# Patient Record
Sex: MALE | Race: WHITE | NOT HISPANIC OR LATINO
[De-identification: names, ages, dates, MRNs, and addresses within clinical notes are randomized per-mention and may not be internally consistent; named-entity substitution may affect disease eponyms.]

---

## 2020-06-08 ENCOUNTER — APPOINTMENT (OUTPATIENT)
Dept: HEART AND VASCULAR | Facility: CLINIC | Age: 75
End: 2020-06-08
Payer: MEDICARE

## 2020-06-08 DIAGNOSIS — I10 ESSENTIAL (PRIMARY) HYPERTENSION: ICD-10-CM

## 2020-06-08 DIAGNOSIS — E78.5 HYPERLIPIDEMIA, UNSPECIFIED: ICD-10-CM

## 2020-06-08 PROBLEM — Z00.00 ENCOUNTER FOR PREVENTIVE HEALTH EXAMINATION: Status: ACTIVE | Noted: 2020-06-08

## 2020-06-08 PROCEDURE — 99204 OFFICE O/P NEW MOD 45 MIN: CPT | Mod: 95

## 2020-06-14 PROBLEM — E78.5 HYPERLIPIDEMIA, MILD: Status: ACTIVE | Noted: 2020-06-14

## 2020-06-14 NOTE — REASON FOR VISIT
[Home] : at home, [unfilled] , at the time of the visit. [Other Location: e.g. Home (Enter Location, City,State)___] : at [unfilled] [Verbal consent obtained from patient] : the patient, [unfilled] [FreeTextEntry1] : Mr. Molina is a 74 year-old

## 2020-06-14 NOTE — PHYSICAL EXAM
[General Appearance - Well Developed] : well developed [Normal Appearance] : normal appearance [General Appearance - Well Nourished] : well nourished [Oriented To Time, Place, And Person] : oriented to person, place, and time

## 2020-06-14 NOTE — HISTORY OF PRESENT ILLNESS
[FreeTextEntry1] : Mr. Molina is a 74 year-old male with a history of hypertension, hyperlipidemia, and atrial flutter who presents to discuss arrhythmia management.   He reports rare, intermittent palpitations since his 20s.  However, these episodes were always brief and self-limited.  In 2018, he had a prolonged episode requiring hospitalization and he was diagnosed with typical atrial flutter.  The episode terminated while the patient was being rate controlled and he was discharged on Xarelto for stroke prophylaxis.  He has continued to have brief, self-limited episodes until May 2020, when he was again admitted with a prolonged episode of palpitations.  He was again in atrial flutter that spontaneously terminated and restarted.  Due to poor rate control, he was started on amiodarone for rhythm control.  He presents today to discuss the merits of ablation.  He states that since starting the amiodarone, his episodes are briefer in nature, but still associated with palpitations.   He cannot identify any triggers or relieving factors.  In addition, he denies any associated symptoms, including chest pain, SOB, SWANSON, LE edema, orthopnea, and PND.  He has been tolerating his Xarelto without any bleeding issues.

## 2020-06-14 NOTE — REVIEW OF SYSTEMS
[Shortness Of Breath] : no shortness of breath [Chest Pain] : no chest pain [Palpitations] : palpitations [Negative] : Endocrine

## 2020-06-14 NOTE — DISCUSSION/SUMMARY
[FreeTextEntry1] : Mr. Molina is a 74 year-old male with symptomatic typical atrial flutter.  He was unable to achieve an adequate level of rate control despite aggressive AV diana blockade and, hence, continued to have symptoms.  As such a rhythm control strategy is appropriate.  However, given the long-term toxicities associated with amiodarone, we discussed the merits of alternative AAD or an ablation.  After an extensive discussion regarding the risks and benefits of each, he has opted to proceed with ablation.  A review of all his EKGs failed to reveal afib, only atrial flutter.  He will proceed with a flutter ablation next weeks. In the interim, he will remain on toprol, xarelot, and amiodarone at the current doses.  He knows to contact me in the interim with any concerns or issues.

## 2020-06-18 ENCOUNTER — OUTPATIENT (OUTPATIENT)
Dept: OUTPATIENT SERVICES | Facility: HOSPITAL | Age: 75
LOS: 1 days | Discharge: HOME | End: 2020-06-18
Payer: MEDICARE

## 2020-06-18 PROCEDURE — 93655 ICAR CATH ABLTJ DSCRT ARRHYT: CPT

## 2020-06-18 PROCEDURE — 93613 INTRACARDIAC EPHYS 3D MAPG: CPT

## 2020-06-18 PROCEDURE — 93621 COMP EP EVL L PAC&REC C SINS: CPT

## 2020-06-18 PROCEDURE — C1894: CPT

## 2020-06-18 PROCEDURE — 93621 COMP EP EVL L PAC&REC C SINS: CPT | Mod: 26

## 2020-06-18 PROCEDURE — 93653 COMPRE EP EVAL TX SVT: CPT

## 2020-06-18 PROCEDURE — C2630: CPT

## 2020-06-18 PROCEDURE — C1766: CPT

## 2020-06-18 PROCEDURE — C1730: CPT

## 2020-06-18 NOTE — PROGRESS NOTE ADULT - SUBJECTIVE AND OBJECTIVE BOX
EPS Post-Procedure Note    S/p ablation of the cavotricuspid isthmus in the setting of typical atrial flutter  Bilateral femoral vein access  General anesthesia  Patient tolerated procedure well without complication      Plan:  Bedrest x 5 hours  Resume home dose of AC 6 hours post-procedure  Groin checks as per unit protocol  Likely discharge home today

## 2020-06-29 DIAGNOSIS — I48.4 ATYPICAL ATRIAL FLUTTER: ICD-10-CM

## 2020-07-17 ENCOUNTER — APPOINTMENT (OUTPATIENT)
Dept: HEART AND VASCULAR | Facility: CLINIC | Age: 75
End: 2020-07-17
Payer: MEDICARE

## 2020-07-17 PROCEDURE — 99214 OFFICE O/P EST MOD 30 MIN: CPT

## 2020-08-11 RX ORDER — AMIODARONE HYDROCHLORIDE 200 MG/1
200 TABLET ORAL
Qty: 56 | Refills: 1 | Status: DISCONTINUED | COMMUNITY
End: 2020-08-11

## 2020-08-11 NOTE — REVIEW OF SYSTEMS
[Negative] : Integumentary [Chest Pain] : no chest pain [Shortness Of Breath] : no shortness of breath

## 2020-08-11 NOTE — DISCUSSION/SUMMARY
[FreeTextEntry1] : Mr. Molina is a 75 year-old male with symptomatic typical atrial flutter s/p successful CTI ablation.  I have asked him to stop his amiodarone and maintain toprol/xarelto for now.  He will undergo ILR implantation to monitor for comorbid AF.  If no AF noted, will stop Xarelto.  He will follow-up in 2-3 months.   He knows to contact me in the interim with any concerns or issues.

## 2020-08-11 NOTE — HISTORY OF PRESENT ILLNESS
[FreeTextEntry1] : Mr. Molina is a 75 year-old male with a history of hypertension, hyperlipidemia, and atrial flutter who presented to discuss management of his typical atrial flutter.   He reported rare, intermittent palpitations since his 20s.  However, these episodes were always brief and self-limited.  In 2018, he had a prolonged episode requiring hospitalization and he was diagnosed with typical atrial flutter.  The episode terminated while the patient was being rate controlled and he was discharged on Xarelto for stroke prophylaxis.  He continued to have brief, self-limited episodes until May 2020, when he was again admitted with a prolonged episode of palpitations.  He was again in atrial flutter that spontaneously terminated and restarted.  Due to poor rate control, he was started on amiodarone for rhythm control.  In an attempt to avoid long term AAD use, he underwent successful ablation of the CTI in July 2020.  \par \par   He presents today for follow-up.  He states that since the ablation has not had any events.  In addition, he denies  chest pain, SOB, SWANSON, LE edema, orthopnea, and PND.  He has been tolerating his Xarelto without any bleeding issues.

## 2020-08-11 NOTE — PHYSICAL EXAM
[General Appearance - Well Developed] : well developed [General Appearance - Well Nourished] : well nourished [Normal Appearance] : normal appearance [Oriented To Time, Place, And Person] : oriented to person, place, and time [Well Groomed] : well groomed [No Deformities] : no deformities [General Appearance - In No Acute Distress] : no acute distress [Normal Conjunctiva] : the conjunctiva exhibited no abnormalities [Eyelids - No Xanthelasma] : the eyelids demonstrated no xanthelasmas [Normal Oral Mucosa] : normal oral mucosa [No Oral Pallor] : no oral pallor [Normal Jugular Venous A Waves Present] : normal jugular venous A waves present [No Oral Cyanosis] : no oral cyanosis [Normal Jugular Venous V Waves Present] : normal jugular venous V waves present [No Jugular Venous Jules A Waves] : no jugular venous jules A waves [Respiration, Rhythm And Depth] : normal respiratory rhythm and effort [] : no respiratory distress [Exaggerated Use Of Accessory Muscles For Inspiration] : no accessory muscle use [Auscultation Breath Sounds / Voice Sounds] : lungs were clear to auscultation bilaterally [Heart Rate And Rhythm] : heart rate and rhythm were normal [Heart Sounds] : normal S1 and S2 [Murmurs] : no murmurs present

## 2020-10-16 ENCOUNTER — APPOINTMENT (OUTPATIENT)
Dept: HEART AND VASCULAR | Facility: CLINIC | Age: 75
End: 2020-10-16
Payer: MEDICARE

## 2020-10-16 PROCEDURE — 99214 OFFICE O/P EST MOD 30 MIN: CPT | Mod: 25

## 2020-10-17 NOTE — HISTORY OF PRESENT ILLNESS
[FreeTextEntry1] : Mr. Molina is a 75 year-old male with a history of hypertension, hyperlipidemia, and atrial flutter who presented to discuss management of his typical atrial flutter.   He reported rare, intermittent palpitations since his 20s.  However, these episodes were always brief and self-limited.  In 2018, he had a prolonged episode requiring hospitalization and he was diagnosed with typical atrial flutter.  The episode terminated while the patient was being rate controlled and he was discharged on Xarelto for stroke prophylaxis.  He continued to have brief, self-limited episodes until May 2020, when he was again admitted with a prolonged episode of palpitations.  He was again in atrial flutter that spontaneously terminated and restarted.  Due to poor rate control, he was started on amiodarone for rhythm control.  In an attempt to avoid long term AAD use, he underwent successful ablation of the CTI in July 2020.  \par \par   He presents today for follow-up.  He states that since the ablation has not had any events beyond one 15 second episode of palpitations.    In addition, he denies  chest pain, SOB, SWANSON, LE edema, orthopnea, and PND.  He has been tolerating his Xarelto without any bleeding issues.

## 2020-10-17 NOTE — DISCUSSION/SUMMARY
[FreeTextEntry1] : Mr. Molina is a 75 year-old male with symptomatic typical atrial flutter s/p successful CTI ablation.  I have asked him to  maintain toprol/xarelto for now.  He will undergo ambulatory telemetry to monitor for comorbid AF.  If no AF noted, will stop Xarelto.  He will follow-up in 2-3 months.   He knows to contact me in the interim with any concerns or issues.

## 2020-10-17 NOTE — PHYSICAL EXAM
[General Appearance - Well Developed] : well developed [Normal Appearance] : normal appearance [Well Groomed] : well groomed [General Appearance - Well Nourished] : well nourished [No Deformities] : no deformities [General Appearance - In No Acute Distress] : no acute distress [Normal Conjunctiva] : the conjunctiva exhibited no abnormalities [Eyelids - No Xanthelasma] : the eyelids demonstrated no xanthelasmas [Normal Oral Mucosa] : normal oral mucosa [No Oral Pallor] : no oral pallor [No Oral Cyanosis] : no oral cyanosis [Normal Jugular Venous A Waves Present] : normal jugular venous A waves present [Normal Jugular Venous V Waves Present] : normal jugular venous V waves present [No Jugular Venous Jules A Waves] : no jugular venous jules A waves [Respiration, Rhythm And Depth] : normal respiratory rhythm and effort [Exaggerated Use Of Accessory Muscles For Inspiration] : no accessory muscle use [Auscultation Breath Sounds / Voice Sounds] : lungs were clear to auscultation bilaterally [Heart Rate And Rhythm] : heart rate and rhythm were normal [Heart Sounds] : normal S1 and S2 [Murmurs] : no murmurs present [Abdomen Soft] : soft [Abdomen Tenderness] : non-tender [] : no hepato-splenomegaly [Abdomen Mass (___ Cm)] : no abdominal mass palpated [Abnormal Walk] : normal gait [Gait - Sufficient For Exercise Testing] : the gait was sufficient for exercise testing [Oriented To Time, Place, And Person] : oriented to person, place, and time

## 2020-11-05 ENCOUNTER — APPOINTMENT (OUTPATIENT)
Dept: HEART AND VASCULAR | Facility: CLINIC | Age: 75
End: 2020-11-05
Payer: MEDICARE

## 2020-11-05 PROCEDURE — 93228 REMOTE 30 DAY ECG REV/REPORT: CPT

## 2020-11-23 ENCOUNTER — TRANSCRIPTION ENCOUNTER (OUTPATIENT)
Age: 75
End: 2020-11-23

## 2020-12-01 ENCOUNTER — APPOINTMENT (OUTPATIENT)
Dept: PULMONOLOGY | Facility: HOSPITAL | Age: 75
End: 2020-12-01
Payer: MEDICARE

## 2020-12-01 VITALS — WEIGHT: 220 LBS | BODY MASS INDEX: 31.5 KG/M2 | HEIGHT: 70 IN

## 2020-12-01 PROCEDURE — 99203 OFFICE O/P NEW LOW 30 MIN: CPT | Mod: 95

## 2020-12-01 NOTE — ASSESSMENT
[FreeTextEntry1] : 75-year-old man with history of atrial fibrillation s/p ablation, he is having elevated blood pressure lately will need to rule out sleep apnea as underlying etiology for it.\par \par Due to Covid patient is hesitant to do an in lab study, will do a home study.

## 2020-12-01 NOTE — HISTORY OF PRESENT ILLNESS
[FreeTextEntry1] : 75 year old man  with history of a fib s.p ablation last yr is here in the sleep center to rule out sleep apnea.  Patient is slightly sleepy with [place] sleepiness score of 11.  Patient has minimal snoring, does not have any witnessed apneas.  Patient's bedtime is around 11 PM wakes up in the morning around 7 AM.  He feels rested when he wakes up.  Patient drinks 1 cup of coffee during the daytime , patient does not have any headaches or nocturia.  He is not sleepy while driving.\par \par

## 2020-12-01 NOTE — REASON FOR VISIT
[Initial Evaluation] : an initial evaluation [Home] : at home, [unfilled] , at the time of the visit. [Medical Office: ([facility])___] : at the medical office located in  [Verbal consent obtained from patient] : the patient, [unfilled] [FreeTextEntry1] : sleep apnea

## 2020-12-21 ENCOUNTER — NON-APPOINTMENT (OUTPATIENT)
Age: 75
End: 2020-12-21

## 2021-01-22 ENCOUNTER — APPOINTMENT (OUTPATIENT)
Dept: HEART AND VASCULAR | Facility: CLINIC | Age: 76
End: 2021-01-22
Payer: MEDICARE

## 2021-01-22 VITALS
TEMPERATURE: 97.3 F | SYSTOLIC BLOOD PRESSURE: 145 MMHG | OXYGEN SATURATION: 97 % | WEIGHT: 218 LBS | HEIGHT: 70 IN | HEART RATE: 78 BPM | DIASTOLIC BLOOD PRESSURE: 82 MMHG | BODY MASS INDEX: 31.21 KG/M2

## 2021-01-22 PROCEDURE — 99214 OFFICE O/P EST MOD 30 MIN: CPT | Mod: 25

## 2021-01-22 PROCEDURE — 93000 ELECTROCARDIOGRAM COMPLETE: CPT

## 2021-02-02 NOTE — HISTORY OF PRESENT ILLNESS
[FreeTextEntry1] : Mr. Molina is a 75 year-old male with a history of hypertension, hyperlipidemia, and atrial flutter who presented to discuss management of his typical atrial flutter.   He reported rare, intermittent palpitations since his 20s.  However, these episodes were always brief and self-limited.  In 2018, he had a prolonged episode requiring hospitalization and he was diagnosed with typical atrial flutter.  The episode terminated while the patient was being rate controlled and he was discharged on Xarelto for stroke prophylaxis.  He continued to have brief, self-limited episodes until May 2020, when he was again admitted with a prolonged episode of palpitations.  He was again in atrial flutter that spontaneously terminated and restarted.  Due to poor rate control, he was started on amiodarone for rhythm control.  In an attempt to avoid long term AAD use, he underwent successful ablation of the CTI in July 2020.  \par \par He presents today for follow-up.  He states that since the ablation has not had any events beyond one 15 second episode of palpitations.    In addition, he denies  chest pain, SOB, SWANSON, LE edema, orthopnea, and PND.  He has been tolerating his Xarelto without any bleeding issues.   An event monitor in November 2020 failed to reveal any sustained arrhythmias

## 2021-02-02 NOTE — DISCUSSION/SUMMARY
[FreeTextEntry1] : Mr. Molina is a 75 year-old male with symptomatic typical atrial flutter s/p successful CTI ablation.  I have asked him to maintain toprol/xarelto for now.  He will undergo ambulatory telemetry one more time to monitor for comorbid AF.  If no AF noted, will stop Xarelto.  He will follow-up in 2-3 months.   He knows to contact me in the interim with any concerns or issues.

## 2021-06-25 ENCOUNTER — NON-APPOINTMENT (OUTPATIENT)
Age: 76
End: 2021-06-25

## 2021-06-25 ENCOUNTER — APPOINTMENT (OUTPATIENT)
Dept: HEART AND VASCULAR | Facility: CLINIC | Age: 76
End: 2021-06-25
Payer: MEDICARE

## 2021-06-25 VITALS
BODY MASS INDEX: 32.93 KG/M2 | HEIGHT: 70 IN | DIASTOLIC BLOOD PRESSURE: 70 MMHG | TEMPERATURE: 98 F | SYSTOLIC BLOOD PRESSURE: 130 MMHG | WEIGHT: 230 LBS | HEART RATE: 69 BPM | OXYGEN SATURATION: 97 %

## 2021-06-25 PROCEDURE — 93000 ELECTROCARDIOGRAM COMPLETE: CPT

## 2021-06-25 PROCEDURE — 99214 OFFICE O/P EST MOD 30 MIN: CPT | Mod: 25

## 2021-07-18 NOTE — DISCUSSION/SUMMARY
[FreeTextEntry1] : Mr. Molina is a 75 year-old male with symptomatic typical atrial flutter s/p successful CTI ablation.  I have asked him to maintain toprol/xarelto for now.  He will undergo ILR implantation to monitor for comorbid AF. He will be scheduled in the coming weeks.   If no AF noted, will stop Xarelto.  He will follow-up in 2-3 months.   He knows to contact me in the interim with any concerns or issues.

## 2021-07-18 NOTE — HISTORY OF PRESENT ILLNESS
[FreeTextEntry1] : Mr. Molina is a 76 year-old male with a history of hypertension, hyperlipidemia, and atrial flutter who presented to discuss management of his typical atrial flutter.   He reported rare, intermittent palpitations since his 20s.  However, these episodes were always brief and self-limited.  In 2018, he had a prolonged episode requiring hospitalization and he was diagnosed with typical atrial flutter.  The episode terminated while the patient was being rate controlled and he was discharged on Xarelto for stroke prophylaxis.  He continued to have brief, self-limited episodes until May 2020, when he was again admitted with a prolonged episode of palpitations.  He was again in atrial flutter that spontaneously terminated and restarted.  Due to poor rate control, he was started on amiodarone for rhythm control.  In an attempt to avoid long term AAD use, he underwent successful ablation of the CTI in July 2020.  \par \par He presents today for follow-up.  He states that since the ablation has not had any events beyond one 15 second episode of palpitations.    In addition, he denies  chest pain, SOB, SWANSON, LE edema, orthopnea, and PND.  He has been tolerating his Xarelto without any bleeding issues.   An event monitor in November 2020 failed to reveal any sustained arrhythmias

## 2021-08-02 ENCOUNTER — NON-APPOINTMENT (OUTPATIENT)
Age: 76
End: 2021-08-02

## 2021-08-05 ENCOUNTER — INPATIENT (INPATIENT)
Facility: HOSPITAL | Age: 76
LOS: 0 days | Discharge: HOME | DRG: 243 | End: 2021-08-06
Attending: INTERNAL MEDICINE | Admitting: INTERNAL MEDICINE
Payer: COMMERCIAL

## 2021-08-05 VITALS
DIASTOLIC BLOOD PRESSURE: 73 MMHG | OXYGEN SATURATION: 95 % | SYSTOLIC BLOOD PRESSURE: 149 MMHG | HEART RATE: 88 BPM | RESPIRATION RATE: 18 BRPM

## 2021-08-05 DIAGNOSIS — I49.5 SICK SINUS SYNDROME: ICD-10-CM

## 2021-08-05 DIAGNOSIS — I48.0 PAROXYSMAL ATRIAL FIBRILLATION: ICD-10-CM

## 2021-08-05 DIAGNOSIS — I10 ESSENTIAL (PRIMARY) HYPERTENSION: ICD-10-CM

## 2021-08-05 DIAGNOSIS — E78.5 HYPERLIPIDEMIA, UNSPECIFIED: ICD-10-CM

## 2021-08-05 PROCEDURE — 33286 RMVL SUBQ CAR RHYTHM MNTR: CPT

## 2021-08-05 PROCEDURE — 33208 INSRT HEART PM ATRIAL & VENT: CPT | Mod: KX

## 2021-08-05 PROCEDURE — 71046 X-RAY EXAM CHEST 2 VIEWS: CPT | Mod: 26

## 2021-08-05 RX ORDER — METOPROLOL TARTRATE 50 MG
100 TABLET ORAL
Refills: 0 | Status: DISCONTINUED | OUTPATIENT
Start: 2021-08-05 | End: 2021-08-06

## 2021-08-05 RX ORDER — VANCOMYCIN HCL 1 G
1500 VIAL (EA) INTRAVENOUS ONCE
Refills: 0 | Status: DISCONTINUED | OUTPATIENT
Start: 2021-08-05 | End: 2021-08-05

## 2021-08-05 RX ORDER — AMLODIPINE BESYLATE 2.5 MG/1
10 TABLET ORAL DAILY
Refills: 0 | Status: DISCONTINUED | OUTPATIENT
Start: 2021-08-05 | End: 2021-08-06

## 2021-08-05 RX ORDER — CEFAZOLIN SODIUM 1 G
2000 VIAL (EA) INJECTION ONCE
Refills: 0 | Status: COMPLETED | OUTPATIENT
Start: 2021-08-05 | End: 2021-08-05

## 2021-08-05 RX ORDER — ATORVASTATIN CALCIUM 80 MG/1
40 TABLET, FILM COATED ORAL AT BEDTIME
Refills: 0 | Status: DISCONTINUED | OUTPATIENT
Start: 2021-08-05 | End: 2021-08-06

## 2021-08-05 RX ORDER — CEFAZOLIN SODIUM 1 G
VIAL (EA) INJECTION
Refills: 0 | Status: COMPLETED | OUTPATIENT
Start: 2021-08-05 | End: 2021-08-06

## 2021-08-05 RX ORDER — RIVAROXABAN 15 MG-20MG
20 KIT ORAL DAILY
Refills: 0 | Status: DISCONTINUED | OUTPATIENT
Start: 2021-08-05 | End: 2021-08-05

## 2021-08-05 RX ORDER — CEFAZOLIN SODIUM 1 G
2000 VIAL (EA) INJECTION EVERY 8 HOURS
Refills: 0 | Status: COMPLETED | OUTPATIENT
Start: 2021-08-05 | End: 2021-08-06

## 2021-08-05 RX ORDER — ACETAMINOPHEN 500 MG
650 TABLET ORAL EVERY 6 HOURS
Refills: 0 | Status: DISCONTINUED | OUTPATIENT
Start: 2021-08-05 | End: 2021-08-06

## 2021-08-05 RX ORDER — LOSARTAN POTASSIUM 100 MG/1
50 TABLET, FILM COATED ORAL DAILY
Refills: 0 | Status: DISCONTINUED | OUTPATIENT
Start: 2021-08-05 | End: 2021-08-06

## 2021-08-05 RX ADMIN — Medication 650 MILLIGRAM(S): at 17:24

## 2021-08-05 RX ADMIN — ATORVASTATIN CALCIUM 40 MILLIGRAM(S): 80 TABLET, FILM COATED ORAL at 21:45

## 2021-08-05 RX ADMIN — AMLODIPINE BESYLATE 10 MILLIGRAM(S): 2.5 TABLET ORAL at 17:25

## 2021-08-05 RX ADMIN — LOSARTAN POTASSIUM 50 MILLIGRAM(S): 100 TABLET, FILM COATED ORAL at 21:46

## 2021-08-05 RX ADMIN — Medication 100 MILLIGRAM(S): at 21:45

## 2021-08-05 RX ADMIN — Medication 100 MILLIGRAM(S): at 09:10

## 2021-08-05 RX ADMIN — Medication 100 MILLIGRAM(S): at 15:53

## 2021-08-05 NOTE — H&P ADULT - HISTORY OF PRESENT ILLNESS
Mr. Molina is a 76 year-old male with a history of hypertension, hyperlipidemia, and atrial flutter, s/p CTI 7/2020, and recently diagnosed AF w/ long conversion pauses on ILR who presents today for a dual chamber pacemaker.     The patient had been feeling well since the CTI line with minimal palpitations. He had an ILR implanted to determine if he still needed to be anticoagulated and within a few days he was found to have paroxysmal AF with conversion pauses of up to 9s. He denies syncope. Given significant sinus node dysfunction, he was recommended for a pacemaker. He presents today feeling well. No c/p, sob, lightheadedness, or palpitations.

## 2021-08-05 NOTE — H&P ADULT - NSICDXPASTMEDICALHX_GEN_ALL_CORE_FT
PAST MEDICAL HISTORY:  Atrial flutter     Hyperlipidemia     Hypertension     Paroxysmal atrial fibrillation     Sinus node dysfunction

## 2021-08-05 NOTE — H&P ADULT - PROBLEM SELECTOR PLAN 1
The pacemaker implant and ILR explant procedures, including risks were explained in detail. Risks including infection, bleeding, pneumothorax, perforation and effusion were discussed. All questions answered and informed consent signed.   - Admit overnight for HD monitoring.  - Vancomycin x2 doses for infection prophylaxis.  - Monitor pocket for bleeding and hematoma.

## 2021-08-05 NOTE — CHART NOTE - NSCHARTNOTEFT_GEN_A_CORE
JONO HO  [id number]    PROCEDURE:  Dual chamber pacemaker implantation ( MDT)    INDICATION:  Sick sinus syndrome     ELECTROPHYSIOLOGIST(S):  Dr. Christen Oh     ANESTHESIOLOGY:  MAC and local anesthesia     FINDINGS:  left subcutaneous infra clavicular pocket made, cephalic vein used as access, RV lead positioned in the low septal region and the ra lead in the right atrial appendage. pressure dressing applied. Device programmed to DDD . Previously implanted left anterior chest wall ILR explanted.     COMPLICATIONS:  none      RECOMMENDATIONS:  chest xray pa and lateral veiw in the am.   ancef 2g q8 hours for 2 Doses.   Hold rivaroxaban 20 mg tonight.   Resume all other home medications,

## 2021-08-05 NOTE — H&P ADULT - ASSESSMENT
Mr. Molina is a 76 year-old male with a history of hypertension, hyperlipidemia, and atrial flutter, s/p CTI 7/2020, and recently diagnosed AF w/ long conversion pauses on ILR who presents today for a dual chamber pacemaker.

## 2021-08-06 ENCOUNTER — TRANSCRIPTION ENCOUNTER (OUTPATIENT)
Age: 76
End: 2021-08-06

## 2021-08-06 VITALS — TEMPERATURE: 98 F

## 2021-08-06 LAB
COVID-19 SPIKE DOMAIN AB INTERP: POSITIVE
COVID-19 SPIKE DOMAIN ANTIBODY RESULT: >250 U/ML — HIGH
HCV AB S/CO SERPL IA: 0.04 S/CO — SIGNIFICANT CHANGE UP
HCV AB SERPL-IMP: SIGNIFICANT CHANGE UP
SARS-COV-2 IGG+IGM SERPL QL IA: >250 U/ML — HIGH
SARS-COV-2 IGG+IGM SERPL QL IA: POSITIVE

## 2021-08-06 PROCEDURE — 86803 HEPATITIS C AB TEST: CPT

## 2021-08-06 PROCEDURE — C1898: CPT

## 2021-08-06 PROCEDURE — C1889: CPT

## 2021-08-06 PROCEDURE — 71046 X-RAY EXAM CHEST 2 VIEWS: CPT

## 2021-08-06 PROCEDURE — 36415 COLL VENOUS BLD VENIPUNCTURE: CPT

## 2021-08-06 PROCEDURE — C1769: CPT

## 2021-08-06 PROCEDURE — C1892: CPT

## 2021-08-06 PROCEDURE — 86769 SARS-COV-2 COVID-19 ANTIBODY: CPT

## 2021-08-06 PROCEDURE — C1785: CPT

## 2021-08-06 PROCEDURE — 33208 INSRT HEART PM ATRIAL & VENT: CPT

## 2021-08-06 RX ADMIN — Medication 650 MILLIGRAM(S): at 09:58

## 2021-08-06 RX ADMIN — Medication 100 MILLIGRAM(S): at 05:49

## 2021-08-06 RX ADMIN — Medication 100 MILLIGRAM(S): at 05:48

## 2021-08-06 RX ADMIN — Medication 650 MILLIGRAM(S): at 08:58

## 2021-08-06 RX ADMIN — Medication 650 MILLIGRAM(S): at 01:19

## 2021-08-06 RX ADMIN — Medication 650 MILLIGRAM(S): at 00:19

## 2021-08-06 RX ADMIN — AMLODIPINE BESYLATE 10 MILLIGRAM(S): 2.5 TABLET ORAL at 05:48

## 2021-08-06 NOTE — PROGRESS NOTE ADULT - SUBJECTIVE AND OBJECTIVE BOX
Electrophysiology Device Interrogation       Indication: new implant    Device model: 	MEdtronic [place] XT DR			                            Functioning Mode: AAI<=> DDD		    Underlying Rhythm:  SR, low rate 60 bpm, upper track 120 bpm     Pacemaker dependency: not dependent; a-paced 22%    Battery status: GUERA    Interrogating parameters:   				RA		        RV		  Sense:                                    3.5mV                        8.9mV  Threshold:                              0.50V@0.4ms             0.75 V @ 0.40ms                                                          Pacing Impedance:                  494                            589                                                                                                                                                          Events/Alert:  none.    Parameter change: none     Assessment & Plan:  - normally functioning PPM. CXR shows appropriate lead placement. incision site is free of bleeding and hematoma. No increase in swelling. Being discharged to home today

## 2021-08-06 NOTE — DISCHARGE NOTE PROVIDER - NSDCFUADDINST_GEN_ALL_CORE_FT
You have a dual chamber pacemaker placed on 8/5/21 by Dr. Christen Oh.    It is important that you avoid any heavylifting more than 10lbs, and any movement of left arm above the level of shoulder for 4 weeks. We encourage you to move the left arm for passive range of motion activities to avoid a painful frozen shoulder.    Please monitor your incision site for any active bleeding, increased swelling and signs of infection. Please call our office at the number provided with any questions or concerns.    You may shower normally tomorrow. Do not scrub at the incision site. Do not submerge incision site in water such as baths, pools, or jacuzzi. There is a medical glue covering the incision. This glue will flake off on its own in a few weeks. Please do not pick or peel the glue, as this will increase infection and bleeding risk.     The incision site does not need to be covered. Do not apply any ointments or powders. Leave open to air.    You have a follow up appointment schedule with Dr. Oh on 8/27/21 @ 1:50pm at the [facility] location. If you need to reschedule or have questions please feel free to call office at: [phone]

## 2021-08-06 NOTE — DISCHARGE NOTE PROVIDER - CARE PROVIDER_API CALL
Christen Oh)  Cardiac Electrophysiology; Cardiovascular Disease; Internal Medicine  [address] [place] [address]  Phone: [phone]  Fax: [phone]  Established Patient  Follow Up Time:

## 2021-08-06 NOTE — DISCHARGE NOTE PROVIDER - NSDCMRMEDTOKEN_GEN_ALL_CORE_FT
amLODIPine 10 mg oral tablet: 1 tab(s) orally once a day  Crestor 10 mg oral tablet: 1 tab(s) orally once a day  losartan 50 mg oral tablet: 1 tab(s) orally once a day  Toprol- mg oral tablet, extended release: 1 tab(s) orally 2 times a day  Xarelto 20 mg oral tablet: 1 tab(s) orally once a day (in the evening)

## 2021-08-06 NOTE — DISCHARGE NOTE PROVIDER - CARE PROVIDERS DIRECT ADDRESSES
[email or web address]

## 2021-08-06 NOTE — DISCHARGE NOTE NURSING/CASE MANAGEMENT/SOCIAL WORK - PATIENT PORTAL LINK FT
You can access the FollowMyHealth Patient Portal offered by [facility] by registering at the following website: [email or web address]. By joining [employer]’s FollowMyHealth portal, you will also be able to view your health information using other applications (apps) compatible with our system.

## 2021-08-06 NOTE — DISCHARGE NOTE PROVIDER - HOSPITAL COURSE
Mr. Molina is a 77 yo male with hypertension, hyperlipidemia, and atrial flutter s/p CTI 7/2020, and recently diagnosed AF w/ long conversion pauses on a loop recorder who presented yesterday for a Medtronic dual chamber pacemaker. Procedure was uncomplicated. Incision site is free of hematoma, active bleeding and increased swelling. Device checked and found to be normally functioning. Chest XR shows appropriate lead placement. No pneumothorax. Pt demonstrates understanding of discharge instructions and care. He is stable for discharge to home.

## 2021-08-13 DIAGNOSIS — I48.0 PAROXYSMAL ATRIAL FIBRILLATION: ICD-10-CM

## 2021-08-13 DIAGNOSIS — I48.92 UNSPECIFIED ATRIAL FLUTTER: ICD-10-CM

## 2021-08-13 DIAGNOSIS — I49.5 SICK SINUS SYNDROME: ICD-10-CM

## 2021-08-13 DIAGNOSIS — I10 ESSENTIAL (PRIMARY) HYPERTENSION: ICD-10-CM

## 2021-08-13 DIAGNOSIS — E78.5 HYPERLIPIDEMIA, UNSPECIFIED: ICD-10-CM

## 2021-08-27 ENCOUNTER — APPOINTMENT (OUTPATIENT)
Dept: HEART AND VASCULAR | Facility: CLINIC | Age: 76
End: 2021-08-27
Payer: MEDICARE

## 2021-08-27 VITALS
HEART RATE: 68 BPM | OXYGEN SATURATION: 98 % | TEMPERATURE: 98.7 F | BODY MASS INDEX: 32.78 KG/M2 | WEIGHT: 229 LBS | SYSTOLIC BLOOD PRESSURE: 130 MMHG | DIASTOLIC BLOOD PRESSURE: 70 MMHG | HEIGHT: 70 IN

## 2021-08-27 PROBLEM — I10 ESSENTIAL (PRIMARY) HYPERTENSION: Chronic | Status: ACTIVE | Noted: 2021-08-05

## 2021-08-27 PROBLEM — E78.5 HYPERLIPIDEMIA, UNSPECIFIED: Chronic | Status: ACTIVE | Noted: 2021-08-05

## 2021-08-27 PROBLEM — I49.5 SICK SINUS SYNDROME: Chronic | Status: ACTIVE | Noted: 2021-08-05

## 2021-08-27 PROBLEM — I48.92 UNSPECIFIED ATRIAL FLUTTER: Chronic | Status: ACTIVE | Noted: 2021-08-05

## 2021-08-27 PROBLEM — I48.0 PAROXYSMAL ATRIAL FIBRILLATION: Chronic | Status: ACTIVE | Noted: 2021-08-05

## 2021-08-27 PROCEDURE — 93280 PM DEVICE PROGR EVAL DUAL: CPT

## 2021-08-27 PROCEDURE — 99214 OFFICE O/P EST MOD 30 MIN: CPT | Mod: 25

## 2021-10-03 NOTE — PHYSICAL EXAM
[General Appearance - Well Developed] : well developed [Normal Appearance] : normal appearance [Well Groomed] : well groomed [General Appearance - Well Nourished] : well nourished [No Deformities] : no deformities [General Appearance - In No Acute Distress] : no acute distress [Heart Sounds] : normal S1 and S2 [Heart Rate And Rhythm] : heart rate and rhythm were normal [Murmurs] : no murmurs present [Respiration, Rhythm And Depth] : normal respiratory rhythm and effort [Exaggerated Use Of Accessory Muscles For Inspiration] : no accessory muscle use [Auscultation Breath Sounds / Voice Sounds] : lungs were clear to auscultation bilaterally [Abdomen Tenderness] : non-tender [Abdomen Soft] : soft [] : no hepato-splenomegaly [Abdomen Mass (___ Cm)] : no abdominal mass palpated [Normal Conjunctiva] : the conjunctiva exhibited no abnormalities [Eyelids - No Xanthelasma] : the eyelids demonstrated no xanthelasmas [Normal Oral Mucosa] : normal oral mucosa [No Oral Pallor] : no oral pallor [No Oral Cyanosis] : no oral cyanosis [Normal Jugular Venous A Waves Present] : normal jugular venous A waves present [Normal Jugular Venous V Waves Present] : normal jugular venous V waves present [No Jugular Venous Jules A Waves] : no jugular venous jules A waves [Abnormal Walk] : normal gait [Gait - Sufficient For Exercise Testing] : the gait was sufficient for exercise testing [Oriented To Time, Place, And Person] : oriented to person, place, and time

## 2021-10-03 NOTE — PROCEDURE
No [Lead Imp:  ___ohms] : lead impedance was [unfilled] ohms [Sensing Amplitude ___mv] : sensing amplitude was [unfilled] mv [___V @] : [unfilled] V [___ ms] : [unfilled] ms [de-identified] : Medtronic [de-identified] : Alanna XT [de-identified] : 8/5/21 [de-identified] : 14.2 years [de-identified] : AF burden 14.1%, rate controlled

## 2021-10-03 NOTE — DISCUSSION/SUMMARY
[FreeTextEntry1] : Mr. Molina is a 76 year-old male with symptomatic typical atrial flutter s/p successful CTI ablation.  Noted to have sinus pauses on ILR, now s/p dual chamber PPM (Medtronic)\par \par 1.  Device - Functioning well.  No programming changes\par \par 2.  AF - Device reveals 14% AF burden with good level of rate control.  Maintain toprol 100mg daily and Xarelto for stroke prophy.  \par \par 3. Follow-up in 3 months

## 2021-10-03 NOTE — HISTORY OF PRESENT ILLNESS
[FreeTextEntry1] : Mr. Molina is a 76 year-old male with a history of hypertension, hyperlipidemia, and atrial flutter who presented to discuss management of his typical atrial flutter.   He reported rare, intermittent palpitations since his 20s.  However, these episodes were always brief and self-limited.  In 2018, he had a prolonged episode requiring hospitalization and he was diagnosed with typical atrial flutter.  The episode terminated while the patient was being rate controlled and he was discharged on Xarelto for stroke prophylaxis.  He continued to have brief, self-limited episodes until May 2020, when he was again admitted with a prolonged episode of palpitations.  He was again in atrial flutter that spontaneously terminated and restarted.  Due to poor rate control, he was started on amiodarone for rhythm control.  In an attempt to avoid long term AAD use, he underwent successful ablation of the CTI in July 2020.   He underwent ILR implantation to evaluate for comorbid AF but was found to have prolonged sinus pauses (up to 9 seconds) associated with dizziness.  As such, he underwent ILR explant and implant of a dual chamber PPM (Medtronic) for SSS in August 2021\par \par He presents today for follow-up.  He states that since the device he has had no dizzy spells.  In addition, h has not had any events beyond one 15 second episode of palpitations.    In addition, he denies  chest pain, SOB, SWANSON, LE edema, orthopnea, and PND.  He has been tolerating his Xarelto without any bleeding issues.

## 2021-10-06 PROBLEM — I10 ESSENTIAL HYPERTENSION: Status: ACTIVE | Noted: 2020-06-14

## 2021-12-06 ENCOUNTER — NON-APPOINTMENT (OUTPATIENT)
Age: 76
End: 2021-12-06

## 2021-12-06 ENCOUNTER — APPOINTMENT (OUTPATIENT)
Dept: HEART AND VASCULAR | Facility: CLINIC | Age: 76
End: 2021-12-06
Payer: MEDICARE

## 2021-12-06 PROCEDURE — 93294 REM INTERROG EVL PM/LDLS PM: CPT | Mod: NC

## 2021-12-06 PROCEDURE — 93296 REM INTERROG EVL PM/IDS: CPT

## 2021-12-21 ENCOUNTER — APPOINTMENT (OUTPATIENT)
Dept: PULMONOLOGY | Facility: CLINIC | Age: 76
End: 2021-12-21
Payer: MEDICARE

## 2021-12-21 VITALS — HEIGHT: 70 IN | BODY MASS INDEX: 32.78 KG/M2 | WEIGHT: 229 LBS

## 2021-12-21 DIAGNOSIS — R06.83 SNORING: ICD-10-CM

## 2021-12-21 PROCEDURE — 99213 OFFICE O/P EST LOW 20 MIN: CPT | Mod: 95

## 2021-12-21 NOTE — ASSESSMENT
[FreeTextEntry1] : 76-year-old man with a flutter, s/post pacemaker. Will do an in lab sleep study to rule out sleep apnea.

## 2021-12-21 NOTE — REASON FOR VISIT
[Follow-Up] : a follow-up visit [Sleep Apnea] : sleep apnea [Home] : at home, [unfilled] , at the time of the visit. [Medical Office: ([facility])___] : at the medical office located in  [Verbal consent obtained from patient] : the patient, [unfilled]

## 2021-12-21 NOTE — HISTORY OF PRESENT ILLNESS
[FreeTextEntry1] : 76 year old man  with history of a flutter s.p ablation 2020, s.p pacemaker due to pauses 2021 is here in the sleep center to rule out sleep apnea.  Patient is slightly sleepy with Waban sleepiness score of 11.  Patient has minimal snoring, does not have any witnessed apneas.  Patient's bedtime is around 11 PM wakes up in the morning around 730-8 AM.  He feels rested when he wakes up.  Patient drinks 1 cup of coffee during the daytime , patient does not have any headaches or nocturia.  He is not sleepy while driving.\par He wakes up short of breath in the early morning hours. He also feels frequent a flutter palpitations.\par \par

## 2022-03-01 ENCOUNTER — APPOINTMENT (OUTPATIENT)
Dept: PULMONOLOGY | Facility: CLINIC | Age: 77
End: 2022-03-01
Payer: MEDICARE

## 2022-03-01 VITALS — WEIGHT: 229 LBS | HEIGHT: 70 IN | BODY MASS INDEX: 32.78 KG/M2

## 2022-03-01 DIAGNOSIS — Z72.89 OTHER PROBLEMS RELATED TO LIFESTYLE: ICD-10-CM

## 2022-03-01 PROCEDURE — 99213 OFFICE O/P EST LOW 20 MIN: CPT

## 2022-03-01 NOTE — PHYSICAL EXAM
[General Appearance - Well Developed] : well developed [General Appearance - Well Nourished] : well nourished [Enlarged Base of the Tongue] : enlargement of the base of the tongue [III] : III [Heart Sounds] : normal S1 and S2 [Murmurs] : no murmurs [] : no respiratory distress [Auscultation Breath Sounds / Voice Sounds] : lungs were clear to auscultation bilaterally [No Focal Deficits] : no focal deficits [Oriented To Time, Place, And Person] : oriented to person, place, and time [Memory Recent] : recent memory was not impaired

## 2022-03-01 NOTE — ASSESSMENT
[FreeTextEntry1] : 76-year-old man with history of atrial flutter s/p ablation and s/p pacemaker has mild obstructive sleep apnea.\par \par Today I discussed the results with the patient and will order AutoPap therapy with 5 to 20 cm of pressure and looking at the download data in few weeks time.

## 2022-03-01 NOTE — HISTORY OF PRESENT ILLNESS
[FreeTextEntry1] : Dr. Washburn\par Dr. Oh\par 76 year old man  with history of a flutter s.p ablation 2020, s.p pacemaker due to pauses 2021 is here in the sleep center to address sleep apnea.  Patient is slightly sleepy with Embudo sleepiness score of 11.  Patient has minimal snoring.  Patient's bedtime is around 11 PM wakes up in the morning around 730-8 AM. \par \par Due to above symptoms patient underwent a sleep study which showed mild obstructive sleep apnea with AHI of 13.3, patient's oxygen saturation under 90% for about 39 minutes during the study.\par \par

## 2022-03-25 ENCOUNTER — APPOINTMENT (OUTPATIENT)
Dept: HEART AND VASCULAR | Facility: CLINIC | Age: 77
End: 2022-03-25
Payer: MEDICARE

## 2022-03-25 VITALS
SYSTOLIC BLOOD PRESSURE: 130 MMHG | OXYGEN SATURATION: 98 % | DIASTOLIC BLOOD PRESSURE: 70 MMHG | WEIGHT: 228 LBS | TEMPERATURE: 98.7 F | BODY MASS INDEX: 32.64 KG/M2 | HEIGHT: 70 IN | HEART RATE: 68 BPM

## 2022-03-25 PROCEDURE — 93280 PM DEVICE PROGR EVAL DUAL: CPT

## 2022-03-25 PROCEDURE — 99213 OFFICE O/P EST LOW 20 MIN: CPT | Mod: 25

## 2022-03-29 NOTE — PROCEDURE
[Lead Imp:  ___ohms] : lead impedance was [unfilled] ohms [Sensing Amplitude ___mv] : sensing amplitude was [unfilled] mv [___V @] : [unfilled] V [___ ms] : [unfilled] ms [de-identified] : Medtronic [de-identified] : Alanna XT [de-identified] : 8/5/21 [de-identified] : 14.1 years [de-identified] : AF burden 1.8%, rate controlled

## 2022-03-29 NOTE — DISCUSSION/SUMMARY
[FreeTextEntry1] : Mr. Molina is a 76 year-old male with symptomatic typical atrial flutter s/p successful CTI ablation.  Noted to have sinus pauses on ILR, now s/p dual chamber PPM (Medtronic)\par \par 1.  Device - Functioning well.  No programming changes\par \par 2.  AF - Device reveals ~2% AF burden with good level of rate control.  Maintain toprol 100mg daily and Xarelto for stroke prophy.  \par \par 3. Follow-up in 3 months

## 2022-03-29 NOTE — HISTORY OF PRESENT ILLNESS
[FreeTextEntry1] : Mr. Molina is a 76 year-old male with a history of hypertension, hyperlipidemia, and atrial flutter who presented to discuss management of his typical atrial flutter.   He reported rare, intermittent palpitations since his 20s.  However, these episodes were always brief and self-limited.  In 2018, he had a prolonged episode requiring hospitalization and he was diagnosed with typical atrial flutter.  The episode terminated while the patient was being rate controlled and he was discharged on Xarelto for stroke prophylaxis.  He continued to have brief, self-limited episodes until May 2020, when he was again admitted with a prolonged episode of palpitations.  He was again in atrial flutter that spontaneously terminated and restarted.  Due to poor rate control, he was started on amiodarone for rhythm control.  In an attempt to avoid long term AAD use, he underwent successful ablation of the CTI in July 2020.   He underwent ILR implantation to evaluate for comorbid AF but was found to have prolonged sinus pauses (up to 9 seconds) associated with dizziness.  As such, he underwent ILR explant and implant of a dual chamber PPM (Medtronic) for SSS in August 2021\par \par He presents today for follow-up.  He states that since the device he has had no dizzy spells.  In addition, he has not had any events beyond one 15 second episode of palpitations.    In addition, he denies  chest pain, SOB, SWANSON, LE edema, orthopnea, and PND.  He has been tolerating his Xarelto without any bleeding issues.

## 2022-03-29 NOTE — PHYSICAL EXAM
[General Appearance - Well Developed] : well developed [Normal Appearance] : normal appearance [Well Groomed] : well groomed [General Appearance - Well Nourished] : well nourished [No Deformities] : no deformities [General Appearance - In No Acute Distress] : no acute distress [Heart Rate And Rhythm] : heart rate and rhythm were normal [Heart Sounds] : normal S1 and S2 [Murmurs] : no murmurs present [Respiration, Rhythm And Depth] : normal respiratory rhythm and effort [Exaggerated Use Of Accessory Muscles For Inspiration] : no accessory muscle use [Auscultation Breath Sounds / Voice Sounds] : lungs were clear to auscultation bilaterally [Clean] : clean [Dry] : dry [Well-Healed] : well-healed [Abdomen Soft] : soft [Abdomen Tenderness] : non-tender [] : no hepato-splenomegaly [Abdomen Mass (___ Cm)] : no abdominal mass palpated [Normal Conjunctiva] : the conjunctiva exhibited no abnormalities [Eyelids - No Xanthelasma] : the eyelids demonstrated no xanthelasmas [Normal Oral Mucosa] : normal oral mucosa [No Oral Pallor] : no oral pallor [No Oral Cyanosis] : no oral cyanosis [Normal Jugular Venous A Waves Present] : normal jugular venous A waves present [Normal Jugular Venous V Waves Present] : normal jugular venous V waves present [No Jugular Venous Jules A Waves] : no jugular venous jules A waves [Abnormal Walk] : normal gait [Gait - Sufficient For Exercise Testing] : the gait was sufficient for exercise testing [Oriented To Time, Place, And Person] : oriented to person, place, and time

## 2022-05-27 ENCOUNTER — APPOINTMENT (OUTPATIENT)
Dept: HEART AND VASCULAR | Facility: CLINIC | Age: 77
End: 2022-05-27
Payer: MEDICARE

## 2022-05-27 ENCOUNTER — NON-APPOINTMENT (OUTPATIENT)
Age: 77
End: 2022-05-27

## 2022-05-27 PROCEDURE — 93296 REM INTERROG EVL PM/IDS: CPT

## 2022-05-27 PROCEDURE — 93294 REM INTERROG EVL PM/LDLS PM: CPT

## 2022-06-13 ENCOUNTER — APPOINTMENT (OUTPATIENT)
Dept: PULMONOLOGY | Facility: CLINIC | Age: 77
End: 2022-06-13
Payer: MEDICARE

## 2022-06-13 VITALS
WEIGHT: 228 LBS | SYSTOLIC BLOOD PRESSURE: 125 MMHG | BODY MASS INDEX: 32.64 KG/M2 | DIASTOLIC BLOOD PRESSURE: 68 MMHG | HEIGHT: 70 IN | HEART RATE: 63 BPM

## 2022-06-13 DIAGNOSIS — U07.1 COVID-19: ICD-10-CM

## 2022-06-13 DIAGNOSIS — G47.33 OBSTRUCTIVE SLEEP APNEA (ADULT) (PEDIATRIC): ICD-10-CM

## 2022-06-13 PROCEDURE — 99213 OFFICE O/P EST LOW 20 MIN: CPT

## 2022-06-13 NOTE — HISTORY OF PRESENT ILLNESS
[FreeTextEntry1] : Dr. Washburn\par Dr. Oh\par 76 year old man  with history of a flutter s.p ablation 2020, s.p pacemaker due to pauses 2021, covid (mild disease may 2022) is here in the sleep center to address sleep apnea.  Patients symptoms improved with cpap, he is less sleepy with Caret sleepiness score of 5.  Patient's bedtime is around 11 PM wakes up in the morning around 730-8 AM. \par \par Due to above symptoms patient underwent a sleep study which showed mild obstructive sleep apnea with AHI of 13.3, patient's oxygen saturation under 90% for about 39 minutes during the study.\par \par AHI 1\par pressure 5 cm\par uses nasal mask\par on yo machine\par dme landae[facility]\par usage 6 hrs\par \par

## 2022-06-13 NOTE — ASSESSMENT
[FreeTextEntry1] : 76-year-old man with history of atrial flutter s/p ablation and s/p pacemaker has mild obstructive sleep apnea.\par \par Mr. Molina is doing well with the CPAP.  Patient is compliant with the CPAP and benefited significantly with the CPAP.\par

## 2022-08-26 ENCOUNTER — NON-APPOINTMENT (OUTPATIENT)
Age: 77
End: 2022-08-26

## 2022-08-26 ENCOUNTER — APPOINTMENT (OUTPATIENT)
Dept: HEART AND VASCULAR | Facility: CLINIC | Age: 77
End: 2022-08-26

## 2022-08-26 PROCEDURE — 93296 REM INTERROG EVL PM/IDS: CPT

## 2022-08-26 PROCEDURE — 93294 REM INTERROG EVL PM/LDLS PM: CPT

## 2022-09-16 ENCOUNTER — APPOINTMENT (OUTPATIENT)
Dept: HEART AND VASCULAR | Facility: CLINIC | Age: 77
End: 2022-09-16

## 2022-09-16 VITALS
OXYGEN SATURATION: 96 % | SYSTOLIC BLOOD PRESSURE: 130 MMHG | WEIGHT: 222 LBS | TEMPERATURE: 97.8 F | BODY MASS INDEX: 31.78 KG/M2 | HEIGHT: 70 IN | HEART RATE: 58 BPM | DIASTOLIC BLOOD PRESSURE: 86 MMHG

## 2022-09-16 PROCEDURE — 99213 OFFICE O/P EST LOW 20 MIN: CPT | Mod: 25

## 2022-09-16 PROCEDURE — 93280 PM DEVICE PROGR EVAL DUAL: CPT

## 2022-09-16 RX ORDER — ROSUVASTATIN CALCIUM 5 MG/1
TABLET, FILM COATED ORAL
Refills: 0 | Status: DISCONTINUED | COMMUNITY
End: 2022-09-16

## 2022-09-16 RX ORDER — LOSARTAN POTASSIUM 100 MG/1
TABLET, FILM COATED ORAL
Refills: 0 | Status: DISCONTINUED | COMMUNITY
End: 2022-09-16

## 2022-09-16 RX ORDER — AMLODIPINE BESYLATE 5 MG/1
TABLET ORAL
Refills: 0 | Status: DISCONTINUED | COMMUNITY
End: 2022-09-16

## 2022-10-06 NOTE — HISTORY OF PRESENT ILLNESS
[FreeTextEntry1] : Mr. Molina is a 77 year-old male with a history of hypertension, hyperlipidemia, and atrial flutter who presented to discuss management of his typical atrial flutter.   He reported rare, intermittent palpitations since his 20s.  However, these episodes were always brief and self-limited.  In 2018, he had a prolonged episode requiring hospitalization and he was diagnosed with typical atrial flutter.  The episode terminated while the patient was being rate controlled and he was discharged on Xarelto for stroke prophylaxis.  He continued to have brief, self-limited episodes until May 2020, when he was again admitted with a prolonged episode of palpitations.  He was again in atrial flutter that spontaneously terminated and restarted.  Due to poor rate control, he was started on amiodarone for rhythm control.  In an attempt to avoid long term AAD use, he underwent successful ablation of the CTI in July 2020.   He underwent ILR implantation to evaluate for comorbid AF but was found to have prolonged sinus pauses (up to 9 seconds) associated with dizziness.  As such, he underwent ILR explant and implant of a dual chamber PPM (Medtronic) for SSS in August 2021\par \par He presents today for follow-up.  He states that since the device he has had no dizzy spells.  In addition, he has not had any events beyond one 15 second episode of palpitations.    In addition, he denies  chest pain, SOB, SWANSON, LE edema, orthopnea, and PND.  He has been tolerating his Xarelto without any bleeding issues.

## 2022-10-06 NOTE — DISCUSSION/SUMMARY
[FreeTextEntry1] : Mr. Molina is a 76 year-old male with symptomatic typical atrial flutter s/p successful CTI ablation.  Noted to have sinus pauses on ILR, now s/p dual chamber PPM (Medtronic)\par \par 1.  Device - Functioning well.  No programming changes\par \par 2.  AF - Device reveals ~5% AF burden with good level of rate control.  Maintain toprol 100mg daily and Xarelto for stroke prophy.  \par \par 3. Follow-up in 3 months

## 2022-10-06 NOTE — PROCEDURE
[Lead Imp:  ___ohms] : lead impedance was [unfilled] ohms [Sensing Amplitude ___mv] : sensing amplitude was [unfilled] mv [___V @] : [unfilled] V [___ ms] : [unfilled] ms [de-identified] : Medtronic [de-identified] : Alanna XT [de-identified] : 8/5/21 [de-identified] : 13.5 years [de-identified] : AF burden 5.4%, rate controlled

## 2022-10-13 ENCOUNTER — NON-APPOINTMENT (OUTPATIENT)
Age: 77
End: 2022-10-13

## 2022-12-16 ENCOUNTER — APPOINTMENT (OUTPATIENT)
Dept: HEART AND VASCULAR | Facility: CLINIC | Age: 77
End: 2022-12-16

## 2022-12-16 PROCEDURE — 93280 PM DEVICE PROGR EVAL DUAL: CPT

## 2022-12-16 PROCEDURE — 99213 OFFICE O/P EST LOW 20 MIN: CPT | Mod: 25

## 2022-12-18 NOTE — HISTORY OF PRESENT ILLNESS
[FreeTextEntry1] : Mr. Molina is a 77 year-old male with a history of hypertension, hyperlipidemia, and atrial flutter who presented to discuss management of his typical atrial flutter.   He reported rare, intermittent palpitations since his 20s.  However, these episodes were always brief and self-limited.  In 2018, he had a prolonged episode requiring hospitalization and he was diagnosed with typical atrial flutter.  The episode terminated while the patient was being rate controlled and he was discharged on Xarelto for stroke prophylaxis.  He continued to have brief, self-limited episodes until May 2020, when he was again admitted with a prolonged episode of palpitations.  He was again in atrial flutter that spontaneously terminated and restarted.  Due to poor rate control, he was started on amiodarone for rhythm control.  In an attempt to avoid long term AAD use, he underwent successful ablation of the CTI in July 2020.   He underwent ILR implantation to evaluate for comorbid AF but was found to have prolonged sinus pauses (up to 9 seconds) associated with dizziness.  As such, he underwent ILR explant and implant of a dual chamber PPM (Medtronic) for SSS in August 2021\par \par He presents today for follow-up.  He states that since the device he has had no dizzy spells.  In addition, he has not had any events beyond a few minutes of palpitations.    In addition, he denies  chest pain, SOB, SWANSON, LE edema, orthopnea, and PND.  He has been tolerating his Xarelto without any bleeding issues.

## 2022-12-18 NOTE — PHYSICAL EXAM
[General Appearance - Well Developed] : well developed [Normal Appearance] : normal appearance [Well Groomed] : well groomed [General Appearance - Well Nourished] : well nourished [No Deformities] : no deformities [General Appearance - In No Acute Distress] : no acute distress [Heart Rate And Rhythm] : heart rate and rhythm were normal [Heart Sounds] : normal S1 and S2 [Murmurs] : no murmurs present [Respiration, Rhythm And Depth] : normal respiratory rhythm and effort [Exaggerated Use Of Accessory Muscles For Inspiration] : no accessory muscle use [Auscultation Breath Sounds / Voice Sounds] : lungs were clear to auscultation bilaterally [Clean] : clean [Dry] : dry [Well-Healed] : well-healed [Abdomen Soft] : soft [] : no hepato-splenomegaly [Abdomen Tenderness] : non-tender [Abdomen Mass (___ Cm)] : no abdominal mass palpated [Normal Conjunctiva] : the conjunctiva exhibited no abnormalities [Eyelids - No Xanthelasma] : the eyelids demonstrated no xanthelasmas [Normal Oral Mucosa] : normal oral mucosa [No Oral Pallor] : no oral pallor [No Oral Cyanosis] : no oral cyanosis [Normal Jugular Venous A Waves Present] : normal jugular venous A waves present [Normal Jugular Venous V Waves Present] : normal jugular venous V waves present [No Jugular Venous Jules A Waves] : no jugular venous jules A waves [Abnormal Walk] : normal gait [Gait - Sufficient For Exercise Testing] : the gait was sufficient for exercise testing [Oriented To Time, Place, And Person] : oriented to person, place, and time

## 2022-12-18 NOTE — PROCEDURE
[Lead Imp:  ___ohms] : lead impedance was [unfilled] ohms [Sensing Amplitude ___mv] : sensing amplitude was [unfilled] mv [___V @] : [unfilled] V [___ ms] : [unfilled] ms [de-identified] : Medtronic [de-identified] : Alanna XT [de-identified] : 8/5/21 [de-identified] : 13.5 years [de-identified] : AF burden 0.4%, rate controlled

## 2022-12-18 NOTE — DISCUSSION/SUMMARY
[FreeTextEntry1] : Mr. Molina is a 76 year-old male with symptomatic typical atrial flutter s/p successful CTI ablation.  Noted to have sinus pauses on ILR, now s/p dual chamber PPM (Medtronic)\par \par 1.  Device - Functioning well.  No programming changes\par \par 2.  AF - Device reveals ~1% AF burden with good level of rate control.  Maintain toprol 100mg daily and Xarelto for stroke prophy.  \par \par 3. Follow-up in 3 months

## 2023-03-17 ENCOUNTER — APPOINTMENT (OUTPATIENT)
Dept: HEART AND VASCULAR | Facility: CLINIC | Age: 78
End: 2023-03-17
Payer: MEDICARE

## 2023-03-17 VITALS
DIASTOLIC BLOOD PRESSURE: 85 MMHG | WEIGHT: 227 LBS | HEART RATE: 62 BPM | SYSTOLIC BLOOD PRESSURE: 145 MMHG | BODY MASS INDEX: 32.5 KG/M2 | OXYGEN SATURATION: 98 % | HEIGHT: 70 IN | TEMPERATURE: 98.7 F

## 2023-03-17 PROCEDURE — 93280 PM DEVICE PROGR EVAL DUAL: CPT

## 2023-03-17 PROCEDURE — 99214 OFFICE O/P EST MOD 30 MIN: CPT | Mod: 25

## 2023-03-19 RX ORDER — ROSUVASTATIN CALCIUM 10 MG/1
10 TABLET, FILM COATED ORAL
Refills: 0 | Status: ACTIVE | COMMUNITY

## 2023-03-19 RX ORDER — LOSARTAN POTASSIUM 100 MG/1
100 TABLET, FILM COATED ORAL
Refills: 0 | Status: ACTIVE | COMMUNITY

## 2023-03-19 NOTE — PROCEDURE
[___ ms] : [unfilled] ms [___V @] : [unfilled] V [Lead Imp:  ___ohms] : lead impedance was [unfilled] ohms [Sensing Amplitude ___mv] : sensing amplitude was [unfilled] mv [de-identified] : Medtronic [de-identified] : Alanna XT [de-identified] : 8/5/21 [de-identified] : 13.2 years [de-identified] : AF burden 5.4%, rate controlled

## 2023-03-19 NOTE — DISCUSSION/SUMMARY
[FreeTextEntry1] : Mr. Molina is a 77 year-old male with symptomatic typical atrial flutter s/p successful CTI ablation.  Noted to have sinus pauses on ILR, now s/p dual chamber PPM (Medtronic)\par \par 1.  Device - Functioning well.  No programming changes\par \par 2.  AF - Device reveals ~5-6% AF burden with good level of rate control.  Maintain toprol 100mg daily and Xarelto for stroke prophy.  \par \par 3. Follow-up in 6 months

## 2023-04-21 ENCOUNTER — NON-APPOINTMENT (OUTPATIENT)
Age: 78
End: 2023-04-21

## 2023-04-21 ENCOUNTER — APPOINTMENT (OUTPATIENT)
Dept: HEART AND VASCULAR | Facility: CLINIC | Age: 78
End: 2023-04-21
Payer: MEDICARE

## 2023-04-21 PROCEDURE — 93294 REM INTERROG EVL PM/LDLS PM: CPT

## 2023-04-21 PROCEDURE — 93296 REM INTERROG EVL PM/IDS: CPT

## 2023-04-22 ENCOUNTER — NON-APPOINTMENT (OUTPATIENT)
Age: 78
End: 2023-04-22

## 2023-07-23 ENCOUNTER — NON-APPOINTMENT (OUTPATIENT)
Age: 78
End: 2023-07-23

## 2023-07-24 ENCOUNTER — APPOINTMENT (OUTPATIENT)
Dept: HEART AND VASCULAR | Facility: CLINIC | Age: 78
End: 2023-07-24
Payer: MEDICARE

## 2023-07-24 PROCEDURE — 93296 REM INTERROG EVL PM/IDS: CPT

## 2023-07-24 PROCEDURE — 93294 REM INTERROG EVL PM/LDLS PM: CPT

## 2023-09-15 ENCOUNTER — APPOINTMENT (OUTPATIENT)
Dept: HEART AND VASCULAR | Facility: CLINIC | Age: 78
End: 2023-09-15
Payer: MEDICARE

## 2023-09-15 VITALS
OXYGEN SATURATION: 97 % | HEIGHT: 70 IN | DIASTOLIC BLOOD PRESSURE: 66 MMHG | SYSTOLIC BLOOD PRESSURE: 122 MMHG | WEIGHT: 230 LBS | HEART RATE: 83 BPM | BODY MASS INDEX: 32.93 KG/M2

## 2023-09-15 PROCEDURE — 93280 PM DEVICE PROGR EVAL DUAL: CPT

## 2023-09-15 PROCEDURE — 99214 OFFICE O/P EST MOD 30 MIN: CPT | Mod: 25

## 2023-09-15 RX ORDER — HYDROCHLOROTHIAZIDE 25 MG/1
25 TABLET ORAL
Refills: 0 | Status: ACTIVE | COMMUNITY

## 2023-09-15 RX ORDER — AMLODIPINE BESYLATE 10 MG/1
10 TABLET ORAL
Refills: 0 | Status: DISCONTINUED | COMMUNITY
End: 2023-09-15

## 2023-09-15 RX ORDER — METOPROLOL SUCCINATE 100 MG/1
100 TABLET, EXTENDED RELEASE ORAL DAILY
Qty: 90 | Refills: 1 | Status: ACTIVE | COMMUNITY
Start: 1900-01-01 | End: 1900-01-01

## 2023-09-15 RX ORDER — OMEPRAZOLE MAGNESIUM 20 MG/1
20 CAPSULE, DELAYED RELEASE ORAL
Refills: 0 | Status: ACTIVE | COMMUNITY

## 2023-11-02 ENCOUNTER — RX RENEWAL (OUTPATIENT)
Age: 78
End: 2023-11-02

## 2023-11-02 RX ORDER — RIVAROXABAN 20 MG/1
20 TABLET, FILM COATED ORAL
Qty: 90 | Refills: 3 | Status: ACTIVE | COMMUNITY
Start: 1900-01-01 | End: 1900-01-01

## 2023-11-17 ENCOUNTER — APPOINTMENT (OUTPATIENT)
Dept: HEART AND VASCULAR | Facility: CLINIC | Age: 78
End: 2023-11-17

## 2023-12-15 ENCOUNTER — APPOINTMENT (OUTPATIENT)
Dept: HEART AND VASCULAR | Facility: CLINIC | Age: 78
End: 2023-12-15
Payer: MEDICARE

## 2023-12-15 VITALS
OXYGEN SATURATION: 97 % | BODY MASS INDEX: 33.21 KG/M2 | HEIGHT: 70 IN | HEART RATE: 64 BPM | TEMPERATURE: 98.7 F | SYSTOLIC BLOOD PRESSURE: 110 MMHG | DIASTOLIC BLOOD PRESSURE: 70 MMHG | WEIGHT: 232 LBS

## 2023-12-15 PROCEDURE — 99214 OFFICE O/P EST MOD 30 MIN: CPT | Mod: 25

## 2023-12-15 PROCEDURE — 93280 PM DEVICE PROGR EVAL DUAL: CPT

## 2023-12-15 NOTE — PHYSICAL EXAM
[General Appearance - Well Developed] : well developed [Normal Appearance] : normal appearance [Well Groomed] : well groomed [General Appearance - Well Nourished] : well nourished [No Deformities] : no deformities [General Appearance - In No Acute Distress] : no acute distress [Heart Rate And Rhythm] : heart rate and rhythm were normal [Heart Sounds] : normal S1 and S2 [Murmurs] : no murmurs present [Respiration, Rhythm And Depth] : normal respiratory rhythm and effort [Exaggerated Use Of Accessory Muscles For Inspiration] : no accessory muscle use [Auscultation Breath Sounds / Voice Sounds] : lungs were clear to auscultation bilaterally [Clean] : clean [Dry] : dry [Well-Healed] : well-healed [Abdomen Soft] : soft [Abdomen Tenderness] : non-tender [Abdomen Mass (___ Cm)] : no abdominal mass palpated [] : no hepato-splenomegaly [Normal Conjunctiva] : the conjunctiva exhibited no abnormalities [Eyelids - No Xanthelasma] : the eyelids demonstrated no xanthelasmas [Normal Oral Mucosa] : normal oral mucosa [No Oral Pallor] : no oral pallor [No Oral Cyanosis] : no oral cyanosis [Normal Jugular Venous A Waves Present] : normal jugular venous A waves present [Normal Jugular Venous V Waves Present] : normal jugular venous V waves present [No Jugular Venous Jules A Waves] : no jugular venous jules A waves [Abnormal Walk] : normal gait [Gait - Sufficient For Exercise Testing] : the gait was sufficient for exercise testing [Oriented To Time, Place, And Person] : oriented to person, place, and time

## 2023-12-15 NOTE — DISCUSSION/SUMMARY
[FreeTextEntry1] : Mr. Molina is a 78 year-old male with symptomatic typical atrial flutter s/p successful CTI ablation.  Noted to have sinus pauses on ILR, now s/p dual chamber PPM (Medtronic)  1.  Device - Functioning well.  No programming changes.  Rapid AF today and unable to perform threshold testing.   2.  AF - Device reveals increasing AF burden ~23%, w/ rapid rates at time. He has associated palpitations and fatigue.  Increase Toprol XL to 100mg Twice daily.  Continue Xarelto for stroke prophy.  He is ordered for an Echocardiogram to assess LV function.  We discussed rhythm control with an ablation procedure.  We reviewed this procedure in more detail.  We reviewed the risks of ablation including, but not limited to; infection, anesthesia reaction, bleeding, pain, vascular injury, cardiac perforation, esophageal injury/fistula, TE/CVA, phrenic nerve injury, arrhythmia recurrence and death.  After our discussion he would like to proceed with ablation.  He will have a pre op CT on the day of his procedure.   3. Follow up 1mo post procedure.

## 2023-12-15 NOTE — REVIEW OF SYSTEMS
[Fever] : no fever [Chills] : no chills [Feeling Fatigued] : feeling fatigued [SOB] : no shortness of breath [Chest Discomfort] : no chest discomfort [Palpitations] : palpitations [Dizziness] : no dizziness [Negative] : Heme/Lymph

## 2023-12-15 NOTE — PROCEDURE
[Lead Imp:  ___ohms] : lead impedance was [unfilled] ohms [Sensing Amplitude ___mv] : sensing amplitude was [unfilled] mv [___V @] : [unfilled] V [de-identified] : Medtronic [de-identified] : Alanna XT [de-identified] : 8/5/21 [de-identified] : 12.6 years [de-identified] : AF burden 23%

## 2023-12-15 NOTE — ADDENDUM
[FreeTextEntry1] : I, Ana Diaz, am scribing for and the presence of Dr. Oh the following sections: HPI, PMH,Family/social history, ROS, Physical Exam, Assessment / Plan.  I, Christen Oh, personally performed the services described in the documentation, reviewed the documentation recorded by the scribe in my presence and it accurately and completely records my words and actions.

## 2023-12-15 NOTE — HISTORY OF PRESENT ILLNESS
[FreeTextEntry1] : Mr. Molina is a 78 year-old male with a history of hypertension, hyperlipidemia, and atrial flutter who presented to discuss management of his typical atrial flutter.   He reported rare, intermittent palpitations since his 20s.  However, these episodes were always brief and self-limited.  In 2018, he had a prolonged episode requiring hospitalization and he was diagnosed with typical atrial flutter.  The episode terminated while the patient was being rate controlled and he was discharged on Xarelto for stroke prophylaxis.  He continued to have brief, self-limited episodes until May 2020, when he was again admitted with a prolonged episode of palpitations.  He was again in atrial flutter that spontaneously terminated and restarted.  Due to poor rate control, he was started on amiodarone for rhythm control.  In an attempt to avoid long term AAD use, he underwent successful ablation of the CTI in July 2020.   He underwent ILR implantation to evaluate for comorbid AF but was found to have prolonged sinus pauses (up to 9 seconds) associated with dizziness.  As such, he underwent ILR explant and implant of a dual chamber PPM (Medtronic) for SSS in August 2021  He presents today for follow-up.  He reports since increasing palpitations and fatigue over the last couple months.  He denies any dizziness.   He has been tolerating his Xarelto without any bleeding issues.  Device check today shows an increasing burden of AF with rapid rates.

## 2024-01-25 ENCOUNTER — APPOINTMENT (OUTPATIENT)
Dept: CT IMAGING | Facility: HOSPITAL | Age: 79
End: 2024-01-25

## 2024-01-25 ENCOUNTER — INPATIENT (INPATIENT)
Facility: HOSPITAL | Age: 79
LOS: 0 days | Discharge: HOME | DRG: 274 | End: 2024-01-26
Attending: INTERNAL MEDICINE | Admitting: INTERNAL MEDICINE
Payer: MEDICARE

## 2024-01-25 VITALS — WEIGHT: 235.01 LBS | HEIGHT: 70 IN

## 2024-01-25 LAB
ANION GAP SERPL CALC-SCNC: 10 MMOL/L — SIGNIFICANT CHANGE UP (ref 5–17)
APTT BLD: 39.1 SEC — HIGH (ref 24.5–35.6)
BLD GP AB SCN SERPL QL: NEGATIVE — SIGNIFICANT CHANGE UP
BUN SERPL-MCNC: 24 MG/DL — HIGH (ref 7–23)
CALCIUM SERPL-MCNC: 9.2 MG/DL — SIGNIFICANT CHANGE UP (ref 8.4–10.5)
CHLORIDE SERPL-SCNC: 100 MMOL/L — SIGNIFICANT CHANGE UP (ref 96–108)
CO2 SERPL-SCNC: 28 MMOL/L — SIGNIFICANT CHANGE UP (ref 22–31)
CREAT SERPL-MCNC: 1.16 MG/DL — SIGNIFICANT CHANGE UP (ref 0.5–1.3)
EGFR: 65 ML/MIN/1.73M2 — SIGNIFICANT CHANGE UP
GLUCOSE SERPL-MCNC: 99 MG/DL — SIGNIFICANT CHANGE UP (ref 70–99)
HCT VFR BLD CALC: 44.5 % — SIGNIFICANT CHANGE UP (ref 39–50)
HGB BLD-MCNC: 14.6 G/DL — SIGNIFICANT CHANGE UP (ref 13–17)
INR BLD: 1.97 — HIGH (ref 0.85–1.18)
ISTAT ACTK (ACTIVATED CLOTTING TIME KAOLIN): 309 SEC — HIGH (ref 74–137)
ISTAT ACTK (ACTIVATED CLOTTING TIME KAOLIN): 320 SEC — HIGH (ref 74–137)
ISTAT ACTK (ACTIVATED CLOTTING TIME KAOLIN): 320 SEC — HIGH (ref 74–137)
ISTAT ACTK (ACTIVATED CLOTTING TIME KAOLIN): 325 SEC — HIGH (ref 74–137)
ISTAT ACTK (ACTIVATED CLOTTING TIME KAOLIN): 347 SEC — HIGH (ref 74–137)
ISTAT ACTK (ACTIVATED CLOTTING TIME KAOLIN): 353 SEC — HIGH (ref 74–137)
ISTAT ACTK (ACTIVATED CLOTTING TIME KAOLIN): 380 SEC — HIGH (ref 74–137)
ISTAT INR: 2.2 — HIGH (ref 0.88–1.16)
ISTAT PT: 25.5 SEC — HIGH (ref 10–12.9)
ISTAT VENOUS BE: 3 MMOL/L — SIGNIFICANT CHANGE UP (ref -2–3)
ISTAT VENOUS GLUCOSE: 94 MG/DL — SIGNIFICANT CHANGE UP (ref 70–99)
ISTAT VENOUS HCO3: 29 MMOL/L — HIGH (ref 23–28)
ISTAT VENOUS HEMATOCRIT: 44 % — SIGNIFICANT CHANGE UP (ref 39–50)
ISTAT VENOUS HEMOGLOBIN: 15 GM/DL — SIGNIFICANT CHANGE UP (ref 13–17)
ISTAT VENOUS IONIZED CALCIUM: 1.16 MMOL/L — SIGNIFICANT CHANGE UP (ref 1.12–1.3)
ISTAT VENOUS PCO2: 46 MMHG — SIGNIFICANT CHANGE UP (ref 41–51)
ISTAT VENOUS PH: 7.4 — SIGNIFICANT CHANGE UP (ref 7.31–7.41)
ISTAT VENOUS PO2: <66 MMHG — LOW (ref 35–40)
ISTAT VENOUS POTASSIUM: 4.3 MMOL/L — SIGNIFICANT CHANGE UP (ref 3.5–5.3)
ISTAT VENOUS SO2: 52 % — SIGNIFICANT CHANGE UP
ISTAT VENOUS SODIUM: 139 MMOL/L — SIGNIFICANT CHANGE UP (ref 135–145)
ISTAT VENOUS TCO2: 30 MMOL/L — SIGNIFICANT CHANGE UP (ref 22–31)
MCHC RBC-ENTMCNC: 31.3 PG — SIGNIFICANT CHANGE UP (ref 27–34)
MCHC RBC-ENTMCNC: 32.8 GM/DL — SIGNIFICANT CHANGE UP (ref 32–36)
MCV RBC AUTO: 95.3 FL — SIGNIFICANT CHANGE UP (ref 80–100)
NRBC # BLD: 0 /100 WBCS — SIGNIFICANT CHANGE UP (ref 0–0)
PLATELET # BLD AUTO: 194 K/UL — SIGNIFICANT CHANGE UP (ref 150–400)
POTASSIUM SERPL-MCNC: 4.2 MMOL/L — SIGNIFICANT CHANGE UP (ref 3.5–5.3)
POTASSIUM SERPL-SCNC: 4.2 MMOL/L — SIGNIFICANT CHANGE UP (ref 3.5–5.3)
PROTHROM AB SERPL-ACNC: 22 SEC — HIGH (ref 9.5–13)
RBC # BLD: 4.67 M/UL — SIGNIFICANT CHANGE UP (ref 4.2–5.8)
RBC # FLD: 12.9 % — SIGNIFICANT CHANGE UP (ref 10.3–14.5)
RH IG SCN BLD-IMP: POSITIVE — SIGNIFICANT CHANGE UP
SODIUM SERPL-SCNC: 138 MMOL/L — SIGNIFICANT CHANGE UP (ref 135–145)
WBC # BLD: 6.38 K/UL — SIGNIFICANT CHANGE UP (ref 3.8–10.5)
WBC # FLD AUTO: 6.38 K/UL — SIGNIFICANT CHANGE UP (ref 3.8–10.5)

## 2024-01-25 PROCEDURE — 93656 COMPRE EP EVAL ABLTJ ATR FIB: CPT

## 2024-01-25 PROCEDURE — 75572 CT HRT W/3D IMAGE: CPT | Mod: 26,MH

## 2024-01-25 RX ORDER — LOSARTAN POTASSIUM 100 MG/1
1 TABLET, FILM COATED ORAL
Refills: 0 | DISCHARGE

## 2024-01-25 RX ORDER — COLCHICINE 0.6 MG
0.6 TABLET ORAL DAILY
Refills: 0 | Status: DISCONTINUED | OUTPATIENT
Start: 2024-01-25 | End: 2024-01-26

## 2024-01-25 RX ORDER — FUROSEMIDE 40 MG
20 TABLET ORAL ONCE
Refills: 0 | Status: DISCONTINUED | OUTPATIENT
Start: 2024-01-25 | End: 2024-01-25

## 2024-01-25 RX ORDER — RIVAROXABAN 15 MG-20MG
20 KIT ORAL
Refills: 0 | Status: DISCONTINUED | OUTPATIENT
Start: 2024-01-25 | End: 2024-01-26

## 2024-01-25 RX ORDER — ACETAMINOPHEN 500 MG
650 TABLET ORAL EVERY 6 HOURS
Refills: 0 | Status: DISCONTINUED | OUTPATIENT
Start: 2024-01-25 | End: 2024-01-26

## 2024-01-25 RX ORDER — AMLODIPINE BESYLATE 2.5 MG/1
1 TABLET ORAL
Qty: 0 | Refills: 0 | DISCHARGE

## 2024-01-25 RX ORDER — PANTOPRAZOLE SODIUM 20 MG/1
40 TABLET, DELAYED RELEASE ORAL
Refills: 0 | Status: DISCONTINUED | OUTPATIENT
Start: 2024-01-25 | End: 2024-01-26

## 2024-01-25 RX ORDER — METOPROLOL TARTRATE 50 MG
100 TABLET ORAL
Refills: 0 | Status: DISCONTINUED | OUTPATIENT
Start: 2024-01-25 | End: 2024-01-26

## 2024-01-25 RX ORDER — LOSARTAN POTASSIUM 100 MG/1
1 TABLET, FILM COATED ORAL
Qty: 0 | Refills: 0 | DISCHARGE

## 2024-01-25 RX ORDER — LOSARTAN POTASSIUM 100 MG/1
100 TABLET, FILM COATED ORAL DAILY
Refills: 0 | Status: DISCONTINUED | OUTPATIENT
Start: 2024-01-25 | End: 2024-01-26

## 2024-01-25 RX ORDER — FUROSEMIDE 40 MG
20 TABLET ORAL DAILY
Refills: 0 | Status: DISCONTINUED | OUTPATIENT
Start: 2024-01-26 | End: 2024-01-26

## 2024-01-25 RX ORDER — ROSUVASTATIN CALCIUM 5 MG/1
1 TABLET ORAL
Qty: 0 | Refills: 0 | DISCHARGE

## 2024-01-25 RX ORDER — FUROSEMIDE 40 MG
20 TABLET ORAL ONCE
Refills: 0 | Status: COMPLETED | OUTPATIENT
Start: 2024-01-25 | End: 2024-01-25

## 2024-01-25 RX ORDER — METOPROLOL TARTRATE 50 MG
1 TABLET ORAL
Qty: 0 | Refills: 0 | DISCHARGE

## 2024-01-25 RX ORDER — RIVAROXABAN 15 MG-20MG
1 KIT ORAL
Qty: 0 | Refills: 0 | DISCHARGE

## 2024-01-25 RX ADMIN — LOSARTAN POTASSIUM 100 MILLIGRAM(S): 100 TABLET, FILM COATED ORAL at 18:10

## 2024-01-25 RX ADMIN — Medication 100 MILLIGRAM(S): at 18:09

## 2024-01-25 RX ADMIN — Medication 0.6 MILLIGRAM(S): at 18:09

## 2024-01-25 RX ADMIN — Medication 650 MILLIGRAM(S): at 17:04

## 2024-01-25 RX ADMIN — RIVAROXABAN 20 MILLIGRAM(S): KIT at 18:10

## 2024-01-25 RX ADMIN — Medication 650 MILLIGRAM(S): at 18:00

## 2024-01-25 RX ADMIN — Medication 20 MILLIGRAM(S): at 14:07

## 2024-01-25 NOTE — PRE-ANESTHESIA EVALUATION ADULT - NSANTHOSAYNRD_GEN_A_CORE
No. RICKI screening performed.  STOP BANG Legend: 0-2 = LOW Risk; 3-4 = INTERMEDIATE Risk; 5-8 = HIGH Risk

## 2024-01-25 NOTE — PATIENT PROFILE ADULT - FALL HARM RISK - HARM RISK INTERVENTIONS

## 2024-01-25 NOTE — H&P ADULT - HISTORY OF PRESENT ILLNESS
HPI:    78 year old male with history of HTN, RICKI, HLD, AFL s/p CTI July 2020, SSS /p Medtronic dual chamber pacemaker implanted in Aug 2021 with significant pAF burden most recently 38%, symptomatic, here today for AF ablation.    on xarelto, compliant, takes nightly.    PAST MEDICAL & SURGICAL HISTORY:  Hypertension  Hyperlipidemia  Atrial flutter  Paroxysmal atrial fibrillation  Sinus node dysfunction    Social History: no smoking, no drugs, no ETOH     pertinent home medications:    Inpatient Medications:   colchicine 0.6 milliGRAM(s) Oral daily  furosemide   Injectable 20 milliGRAM(s) IV Push once  pantoprazole    Tablet 40 milliGRAM(s) Oral before breakfast      Allergies: penicillins (Hives)      ROS:   CONSTITUTIONAL: No fever, weight loss + fatigue  EYES: Pt denies  RESPIRATORY: No cough, wheezing, chills or hemoptysis; No Shortness of Breath  CARDIOVASCULAR: see HPI  GASTROINTESTINAL: Pt denies  NEUROLOGICAL: Pt denies  SKIN: Pt denies   PSYCHIATRIC: Pt denies  HEME/LYMPH: Pt denies    PHYSICAL:  T(C): --  HR: 75 (01-25-24 @ 15:12) (75 - 75)  BP: 125/70 (01-25-24 @ 15:12) (125/70 - 125/70)  RR: 18 (01-25-24 @ 15:12) (18 - 18)  SpO2: 97% (01-25-24 @ 15:15) (95% - 97%)  Wt(kg): --  Appearance: No acute distress, well developed  Eyes: normal appearing conjunctiva, pupils and eyelids  Cardiovascular: Normal S1 S2, No JVD, No murmurs, No edema  Respiratory: Lungs clear to auscultation	bilaterally.  No wheeze, rhonchi, rales noted  Gastrointestinal:  Soft, NT/ND 	  Neurologic:  No deficit noted  Psych: A&Ox3, normal mood/affect  Musculoskeletal: normal gait  Skin: no rash noted, normal color and pigmentation.        LABS:                        14.6   6.38  )-----------( 194      ( 25 Jan 2024 07:34 )             44.5     01-25    138  |  100  |  24<H>  ----------------------------<  99  4.2   |  28  |  1.16    Ca    9.2      25 Jan 2024 07:34      PT/INR - ( 25 Jan 2024 07:34 )   PT: 22.0 sec;   INR: 1.97          PTT - ( 25 Jan 2024 07:34 )  PTT:39.1 sec    EKG: AP-VS     Assessment Plan:  78 year old male with history of HTN, RICKI, HLD, AFL s/p CTI July 2020, SSS /p Medtronic dual chamber pacemaker implanted in Aug 2021 with significant pAF burden most recently 38%, symptomatic, here today for AF ablation.    -admit post procedure  -TTE before discharge to assess LVEF  -lasix 20mg IV today after bed rest, and will give standing lasix for a few days        HPI:    78 year old male with history of HTN, RICKI, HLD, AFL s/p CTI July 2020, SSS /p Medtronic dual chamber pacemaker implanted in Aug 2021 with significant pAF burden most recently 38%, symptomatic, here today for AF ablation.    on xarelto, compliant, takes nightly.    PAST MEDICAL & SURGICAL HISTORY:  Hypertension  Hyperlipidemia  Atrial flutter  Paroxysmal atrial fibrillation  Sinus node dysfunction    Social History: no smoking, no drugs, no ETOH     pertinent home medications:    Inpatient Medications:   colchicine 0.6 milliGRAM(s) Oral daily  furosemide   Injectable 20 milliGRAM(s) IV Push once  pantoprazole    Tablet 40 milliGRAM(s) Oral before breakfast      Allergies: penicillins (Hives)      ROS:   CONSTITUTIONAL: No fever, weight loss + fatigue  EYES: Pt denies  RESPIRATORY: No cough, wheezing, chills or hemoptysis; No Shortness of Breath  CARDIOVASCULAR: see HPI  GASTROINTESTINAL: Pt denies  NEUROLOGICAL: Pt denies  SKIN: Pt denies   PSYCHIATRIC: Pt denies  HEME/LYMPH: Pt denies    PHYSICAL:  T(C): --  HR: 75 (01-25-24 @ 15:12) (75 - 75)  BP: 125/70 (01-25-24 @ 15:12) (125/70 - 125/70)  RR: 18 (01-25-24 @ 15:12) (18 - 18)  SpO2: 97% (01-25-24 @ 15:15) (95% - 97%)  Wt(kg): --  Appearance: No acute distress, well developed  Eyes: normal appearing conjunctiva, pupils and eyelids  Cardiovascular: Normal S1 S2, No JVD, No murmurs, No edema  Respiratory: Lungs clear to auscultation	bilaterally.  No wheeze, rhonchi, rales noted  Gastrointestinal:  Soft, NT/ND 	  Neurologic:  No deficit noted  Psych: A&Ox3, normal mood/affect  Musculoskeletal: normal gait  Skin: no rash noted, normal color and pigmentation.        LABS:                        14.6   6.38  )-----------( 194      ( 25 Jan 2024 07:34 )             44.5     01-25    138  |  100  |  24<H>  ----------------------------<  99  4.2   |  28  |  1.16    Ca    9.2      25 Jan 2024 07:34      PT/INR - ( 25 Jan 2024 07:34 )   PT: 22.0 sec;   INR: 1.97          PTT - ( 25 Jan 2024 07:34 )  PTT:39.1 sec    EKG: AP-VS     Assessment Plan:  78 year old male with history of HTN, RICKI, HLD, AFL s/p CTI July 2020, SSS /p Medtronic dual chamber pacemaker implanted in Aug 2021 with significant pAF burden most recently 38%, symptomatic, here today for AF ablation.    -admit post procedure  -TTE before discharge to assess LVEF  -lasix 20mg IV today after bed rest, and will give standing lasix for a few days   -colchicine 0.6mg daily x7 days and ppi x30 days post ablation

## 2024-01-25 NOTE — CHART NOTE - NSCHARTNOTEFT_GEN_A_CORE
EPS BRIEF OP NOTE    JONO HO  [id number]    PROCEDURE:  - AF ablation    INDICATION:  - Symptomatic paroxysmal AF    ELECTROPHYSIOLOGIST(S):  - Dr. Oh (Attending)  - Dr. Murphy (Fellow)    ANESTHESIOLOGY:  - Dr. Ralph    SEDATION TYPE:  - General  - Local    FINDINGS:  - US guided access of the bilateral femoral veins. 11Fr in RFV; 11 + 7Fr in LFV  - Trace effusion at the start of the procedure  - Transseptal access obtained via mSilicacross  - Pulmonary vein isolation achieved with combination of cryoablation and RF ablation  - Differential site pacing used to confirm durable CTI block from prior procedure  - No change in trace effusion at the end of the procedure  - Protamine 50mg given to reverse anticoagulation  - Vascade MVP used for hemostasis at all access sites    COMPLICATIONS:  - None    RECOMMENDATIONS:  - 3 hours bedrest  - 20mg IV Lasix today  - Continue uninterrupted anticoagulation  - Continue home meds  - Dispo: Admit for overnight monitoring    Please refer to the full report to follow in [facility] [place] for a detailed description of this case.    --  Lowell Murphy MD  Electrophysiology PGY8

## 2024-01-26 ENCOUNTER — TRANSCRIPTION ENCOUNTER (OUTPATIENT)
Age: 79
End: 2024-01-26

## 2024-01-26 VITALS — SYSTOLIC BLOOD PRESSURE: 139 MMHG | DIASTOLIC BLOOD PRESSURE: 68 MMHG | HEART RATE: 75 BPM | RESPIRATION RATE: 20 BRPM

## 2024-01-26 PROCEDURE — 85347 COAGULATION TIME ACTIVATED: CPT

## 2024-01-26 PROCEDURE — 36415 COLL VENOUS BLD VENIPUNCTURE: CPT

## 2024-01-26 PROCEDURE — 84132 ASSAY OF SERUM POTASSIUM: CPT

## 2024-01-26 PROCEDURE — 82330 ASSAY OF CALCIUM: CPT

## 2024-01-26 PROCEDURE — C1769: CPT

## 2024-01-26 PROCEDURE — 85610 PROTHROMBIN TIME: CPT

## 2024-01-26 PROCEDURE — 75572 CT HRT W/3D IMAGE: CPT

## 2024-01-26 PROCEDURE — C1760: CPT

## 2024-01-26 PROCEDURE — C1733: CPT

## 2024-01-26 PROCEDURE — 85730 THROMBOPLASTIN TIME PARTIAL: CPT

## 2024-01-26 PROCEDURE — 86901 BLOOD TYPING SEROLOGIC RH(D): CPT

## 2024-01-26 PROCEDURE — 85014 HEMATOCRIT: CPT

## 2024-01-26 PROCEDURE — 80048 BASIC METABOLIC PNL TOTAL CA: CPT

## 2024-01-26 PROCEDURE — C1730: CPT

## 2024-01-26 PROCEDURE — 86900 BLOOD TYPING SEROLOGIC ABO: CPT

## 2024-01-26 PROCEDURE — C2630: CPT

## 2024-01-26 PROCEDURE — 86850 RBC ANTIBODY SCREEN: CPT

## 2024-01-26 PROCEDURE — C1766: CPT

## 2024-01-26 PROCEDURE — 85027 COMPLETE CBC AUTOMATED: CPT

## 2024-01-26 PROCEDURE — C1894: CPT

## 2024-01-26 PROCEDURE — 82947 ASSAY GLUCOSE BLOOD QUANT: CPT

## 2024-01-26 PROCEDURE — C1759: CPT

## 2024-01-26 PROCEDURE — 82803 BLOOD GASES ANY COMBINATION: CPT

## 2024-01-26 PROCEDURE — 84295 ASSAY OF SERUM SODIUM: CPT

## 2024-01-26 RX ORDER — COLCHICINE 0.6 MG
1 TABLET ORAL
Qty: 10 | Refills: 0
Start: 2024-01-26 | End: 2024-02-04

## 2024-01-26 RX ORDER — FUROSEMIDE 40 MG
1 TABLET ORAL
Qty: 7 | Refills: 0
Start: 2024-01-26 | End: 2024-02-01

## 2024-01-26 RX ORDER — PANTOPRAZOLE SODIUM 20 MG/1
1 TABLET, DELAYED RELEASE ORAL
Qty: 30 | Refills: 0
Start: 2024-01-26 | End: 2024-02-24

## 2024-01-26 RX ADMIN — Medication 20 MILLIGRAM(S): at 06:49

## 2024-01-26 RX ADMIN — LOSARTAN POTASSIUM 100 MILLIGRAM(S): 100 TABLET, FILM COATED ORAL at 06:50

## 2024-01-26 RX ADMIN — Medication 100 MILLIGRAM(S): at 06:50

## 2024-01-26 RX ADMIN — PANTOPRAZOLE SODIUM 40 MILLIGRAM(S): 20 TABLET, DELAYED RELEASE ORAL at 06:50

## 2024-01-26 NOTE — DISCHARGE NOTE PROVIDER - NSDCMRMEDTOKEN_GEN_ALL_CORE_FT
colchicine 0.6 mg oral tablet: 1 tab(s) orally once a day  Crestor 10 mg oral tablet: 1 tab(s) orally once a day  furosemide 20 mg oral tablet: 1 tab(s) orally once a day  losartan 100 mg oral tablet: 1 tab(s) orally once a day  Protonix 40 mg oral delayed release tablet: 1 tab(s) orally once a day  Toprol- mg oral tablet, extended release: 1 tab(s) orally 2 times a day  Xarelto 20 mg oral tablet: 1 tab(s) orally once a day (in the evening)

## 2024-01-26 NOTE — DISCHARGE NOTE PROVIDER - CARE PROVIDERS DIRECT ADDRESSES
,[email or web address] Acitretin Counseling:  I discussed with the patient the risks of acitretin including but not limited to hair loss, dry lips/skin/eyes, liver damage, hyperlipidemia, depression/suicidal ideation, photosensitivity.  Serious rare side effects can include but are not limited to pancreatitis, pseudotumor cerebri, bony changes, clot formation/stroke/heart attack.  Patient understands that alcohol is contraindicated since it can result in liver toxicity and significantly prolong the elimination of the drug by many years.

## 2024-01-26 NOTE — DISCHARGE NOTE NURSING/CASE MANAGEMENT/SOCIAL WORK - PATIENT PORTAL LINK FT
You can access the FollowMyHealth Patient Portal offered by [facility] by registering at the following website: [email or web address]. By joining [facility]’s FollowMyHealth portal, you will also be able to view your health information using other applications (apps) compatible with our system.

## 2024-01-26 NOTE — DISCHARGE NOTE PROVIDER - CARE PROVIDER_API CALL
Christen Oh  Cardiac Electrophysiology  [address], [address] [place] [address]  Phone: [phone]  Fax: [phone]  Follow Up Time:

## 2024-01-26 NOTE — DISCHARGE NOTE PROVIDER - NSDCFUSCHEDAPPT_GEN_ALL_CORE_FT
[facility] Physician [facility]  [facility] [address]  Scheduled Appointment: 03/15/2024

## 2024-01-26 NOTE — DISCHARGE NOTE PROVIDER - NSDCFUADDINST_GEN_ALL_CORE_FT
You had cryoablation of afib on 1/25/24.  Please continue all your current medications. There are 3 new temporary meds that are used post ablation:  1. Colchicine -- once daily for 1 week  2. Furosemide -- once daily for 1 week  3. Protonix (aka Pantoprazole) -- once daily for 1 month.  Please follow up with Dr. Oh in [facility] next month.   Wound care instruction:  Avoid strenuous activities such as lifting, running, pushing or sex for 1 week in order to avoid bleeding complications in the groin.  If any questions about the groin, call us at [phone].  Ok to shower tonight.  Avoid bathing for 1 week

## 2024-01-26 NOTE — DISCHARGE NOTE PROVIDER - HOSPITAL COURSE
78 year old male with history of HTN, RICKI, HLD, AFL s/p CTI July 2020, SSS /p Medtronic dual chamber pacemaker implanted in Aug 2021 with significant pAF burden most recently 38%, symptomatic, here for AF ablation. s/p elective cryoablation afib on 1/25/24.  No overnight issue.  Report minimal chest discomfort with sleeping on the sides.  No CP with deep breathing or sitting up.  VSS stable.   Bilateral groin wounds are stable without bleeding or hematoma. Tele with NSR. Stable for d/c home today.

## 2024-01-31 DIAGNOSIS — I48.0 PAROXYSMAL ATRIAL FIBRILLATION: ICD-10-CM

## 2024-01-31 DIAGNOSIS — I10 ESSENTIAL (PRIMARY) HYPERTENSION: ICD-10-CM

## 2024-01-31 DIAGNOSIS — G47.33 OBSTRUCTIVE SLEEP APNEA (ADULT) (PEDIATRIC): ICD-10-CM

## 2024-01-31 DIAGNOSIS — Z79.01 LONG TERM (CURRENT) USE OF ANTICOAGULANTS: ICD-10-CM

## 2024-01-31 DIAGNOSIS — E78.5 HYPERLIPIDEMIA, UNSPECIFIED: ICD-10-CM

## 2024-01-31 DIAGNOSIS — Z95.0 PRESENCE OF CARDIAC PACEMAKER: ICD-10-CM

## 2024-02-05 RX ORDER — METOPROLOL SUCCINATE 100 MG/1
100 TABLET, EXTENDED RELEASE ORAL DAILY
Qty: 90 | Refills: 2 | Status: ACTIVE | COMMUNITY
Start: 2023-09-15 | End: 1900-01-01

## 2024-02-16 ENCOUNTER — APPOINTMENT (OUTPATIENT)
Dept: HEART AND VASCULAR | Facility: CLINIC | Age: 79
End: 2024-02-16
Payer: MEDICARE

## 2024-02-16 VITALS
OXYGEN SATURATION: 96 % | HEART RATE: 83 BPM | SYSTOLIC BLOOD PRESSURE: 110 MMHG | TEMPERATURE: 98.7 F | DIASTOLIC BLOOD PRESSURE: 70 MMHG | HEIGHT: 70 IN | WEIGHT: 234 LBS | BODY MASS INDEX: 33.5 KG/M2

## 2024-02-16 DIAGNOSIS — I48.91 UNSPECIFIED ATRIAL FIBRILLATION: ICD-10-CM

## 2024-02-16 DIAGNOSIS — I48.3 TYPICAL ATRIAL FLUTTER: ICD-10-CM

## 2024-02-16 DIAGNOSIS — I49.5 SICK SINUS SYNDROME: ICD-10-CM

## 2024-02-16 PROCEDURE — 99214 OFFICE O/P EST MOD 30 MIN: CPT

## 2024-02-16 PROCEDURE — 93280 PM DEVICE PROGR EVAL DUAL: CPT

## 2024-02-19 PROBLEM — I48.91 ATRIAL FIBRILLATION: Status: ACTIVE | Noted: 2023-12-15

## 2024-02-19 PROBLEM — I49.5 SINUS NODE DYSFUNCTION: Status: ACTIVE | Noted: 2024-02-19

## 2024-02-19 PROBLEM — I48.3 TYPICAL ATRIAL FLUTTER: Status: ACTIVE | Noted: 2020-06-14

## 2024-02-19 NOTE — PROCEDURE
[Lead Imp:  ___ohms] : lead impedance was [unfilled] ohms [Sensing Amplitude ___mv] : sensing amplitude was [unfilled] mv [___V @] : [unfilled] V [___ ms] : [unfilled] ms [de-identified] : Medtronic [de-identified] : Alanna XT [de-identified] : 8/5/21 [de-identified] : 12.3 years [de-identified] : AF burden 3.9%

## 2024-02-19 NOTE — DISCUSSION/SUMMARY
[FreeTextEntry1] : Mr. Molina is a 78 year-old male with symptomatic typical atrial flutter s/p successful CTI ablation.  Noted to have sinus pauses on ILR, s/p dual chamber PPM (SmartEquiptronic), increasing burden of AF now s/p PVI 1/25/24.   1.  Device - Functioning well.  No programming changes.    2.  AF - He is s/p PVI w/ improved symptoms.  Device check today shows episodes of AF.  We discussed that AF can recur in the blanking period.  We will continue to monitor the burden via his pacemaker.  Continue Toprol XL 100mg daily.  Continue Xarelto for stroke prophylaxis.    3. Follow up 6mo post procedure.

## 2024-02-19 NOTE — HISTORY OF PRESENT ILLNESS
[FreeTextEntry1] : Mr. Molina is a 78 year-old male with a history of hypertension, hyperlipidemia, and atrial flutter who presented to discuss management of his typical atrial flutter.   He reported rare, intermittent palpitations since his 20s.  However, these episodes were always brief and self-limited.  In 2018, he had a prolonged episode requiring hospitalization and he was diagnosed with typical atrial flutter.  The episode terminated while the patient was being rate controlled and he was discharged on Xarelto for stroke prophylaxis.  He continued to have brief, self-limited episodes until May 2020, when he was again admitted with a prolonged episode of palpitations.  He was again in atrial flutter that spontaneously terminated and restarted.  Due to poor rate control, he was started on amiodarone for rhythm control.  In an attempt to avoid long term AAD use, he underwent successful ablation of the CTI in July 2020.   He underwent ILR implantation to evaluate for comorbid AF but was found to have prolonged sinus pauses (up to 9 seconds) associated with dizziness.  As such, he underwent ILR explant and implant of a dual chamber PPM (Medtronic) for SSS in August 2021 PPM showed increasing burden of AF w/ associated palpitations and fatigue.  He underwent PVI 1/25/24.  He returns for follow up with improvement of his symptoms.  He has some breif fluttering but no sustained palpitations.

## 2024-02-19 NOTE — PHYSICAL EXAM
Post-Anesthesia Evaluation and Assessment    Patient: Trino Duty MRN: [id number]  SSN: [id number]    YOB: 2001  Age: 16 y.o. Sex: male       Cardiovascular Function/Vital Signs  Visit Vitals    /49    Pulse 86    Temp 36.5 °C (97.7 °F)    Resp 17    Ht 164 cm    Wt 102.5 kg    SpO2 95%    BMI 38.11 kg/m2       Patient is status post general anesthesia for Procedure(s):  TESTICULAR TORSION REPAIR. Nausea/Vomiting: None    Postoperative hydration reviewed and adequate. Pain:  Pain Scale 1: Visual (04/08/17 2155)  Pain Intensity 1: 0 (04/08/17 2155)   Managed    Neurological Status:   Neuro (WDL): Within Defined Limits (04/08/17 2155)   At baseline    Mental Status and Level of Consciousness: Arousable    Pulmonary Status:   O2 Device: Room air (04/08/17 2155)   Adequate oxygenation and airway patent    Complications related to anesthesia: None    Post-anesthesia assessment completed.  No concerns    Signed By: Morris Azevedo CRNA     April 8, 2017 [General Appearance - Well Developed] : well developed [Normal Appearance] : normal appearance [Well Groomed] : well groomed [General Appearance - Well Nourished] : well nourished [No Deformities] : no deformities [General Appearance - In No Acute Distress] : no acute distress [Heart Rate And Rhythm] : heart rate and rhythm were normal [Heart Sounds] : normal S1 and S2 [Murmurs] : no murmurs present [Respiration, Rhythm And Depth] : normal respiratory rhythm and effort [Exaggerated Use Of Accessory Muscles For Inspiration] : no accessory muscle use [Auscultation Breath Sounds / Voice Sounds] : lungs were clear to auscultation bilaterally [Clean] : clean [Dry] : dry [Well-Healed] : well-healed [Abdomen Soft] : soft [Abdomen Tenderness] : non-tender [] : no hepato-splenomegaly [Abdomen Mass (___ Cm)] : no abdominal mass palpated [Normal Conjunctiva] : the conjunctiva exhibited no abnormalities [Eyelids - No Xanthelasma] : the eyelids demonstrated no xanthelasmas [Normal Oral Mucosa] : normal oral mucosa [No Oral Pallor] : no oral pallor [No Oral Cyanosis] : no oral cyanosis [Normal Jugular Venous A Waves Present] : normal jugular venous A waves present [Normal Jugular Venous V Waves Present] : normal jugular venous V waves present [No Jugular Venous Jules A Waves] : no jugular venous jules A waves [Abnormal Walk] : normal gait [Gait - Sufficient For Exercise Testing] : the gait was sufficient for exercise testing [Oriented To Time, Place, And Person] : oriented to person, place, and time

## 2024-05-17 ENCOUNTER — NON-APPOINTMENT (OUTPATIENT)
Age: 79
End: 2024-05-17

## 2024-05-17 ENCOUNTER — APPOINTMENT (OUTPATIENT)
Dept: HEART AND VASCULAR | Facility: CLINIC | Age: 79
End: 2024-05-17
Payer: MEDICARE

## 2024-05-17 PROCEDURE — 93294 REM INTERROG EVL PM/LDLS PM: CPT

## 2024-05-17 PROCEDURE — 93296 REM INTERROG EVL PM/IDS: CPT

## 2024-08-16 ENCOUNTER — APPOINTMENT (OUTPATIENT)
Dept: HEART AND VASCULAR | Facility: CLINIC | Age: 79
End: 2024-08-16

## 2024-11-18 ENCOUNTER — APPOINTMENT (OUTPATIENT)
Dept: HEART AND VASCULAR | Facility: CLINIC | Age: 79
End: 2024-11-18